# Patient Record
Sex: FEMALE | HISPANIC OR LATINO | Employment: FULL TIME | URBAN - METROPOLITAN AREA
[De-identification: names, ages, dates, MRNs, and addresses within clinical notes are randomized per-mention and may not be internally consistent; named-entity substitution may affect disease eponyms.]

---

## 2018-12-05 PROBLEM — R10.2 PELVIC AND PERINEAL PAIN: Status: RESOLVED | Noted: 2018-12-05 | Resolved: 2018-12-05

## 2018-12-05 PROBLEM — R10.2 PELVIC AND PERINEAL PAIN: Status: ACTIVE | Noted: 2018-12-05

## 2019-01-15 ENCOUNTER — HOSPITAL ENCOUNTER (EMERGENCY)
Facility: MEDICAL CENTER | Age: 39
End: 2019-01-15
Attending: EMERGENCY MEDICINE
Payer: COMMERCIAL

## 2019-01-15 VITALS
SYSTOLIC BLOOD PRESSURE: 119 MMHG | WEIGHT: 156.53 LBS | DIASTOLIC BLOOD PRESSURE: 80 MMHG | RESPIRATION RATE: 15 BRPM | HEART RATE: 77 BPM | TEMPERATURE: 97.5 F | HEIGHT: 61 IN | OXYGEN SATURATION: 96 % | BODY MASS INDEX: 29.55 KG/M2

## 2019-01-15 DIAGNOSIS — T31.0 BURNS INVOLVING LESS THAN 10% OF BODY SURFACE: ICD-10-CM

## 2019-01-15 LAB
BASOPHILS # BLD AUTO: 1 % (ref 0–1.8)
BASOPHILS # BLD: 0.08 K/UL (ref 0–0.12)
EOSINOPHIL # BLD AUTO: 0.54 K/UL (ref 0–0.51)
EOSINOPHIL NFR BLD: 6.7 % (ref 0–6.9)
ERYTHROCYTE [DISTWIDTH] IN BLOOD BY AUTOMATED COUNT: 40.7 FL (ref 35.9–50)
HCT VFR BLD AUTO: 43.1 % (ref 37–47)
HGB BLD-MCNC: 14.5 G/DL (ref 12–16)
IMM GRANULOCYTES # BLD AUTO: 0.09 K/UL (ref 0–0.11)
IMM GRANULOCYTES NFR BLD AUTO: 1.1 % (ref 0–0.9)
LYMPHOCYTES # BLD AUTO: 2.5 K/UL (ref 1–4.8)
LYMPHOCYTES NFR BLD: 31 % (ref 22–41)
MCH RBC QN AUTO: 30.5 PG (ref 27–33)
MCHC RBC AUTO-ENTMCNC: 33.6 G/DL (ref 33.6–35)
MCV RBC AUTO: 90.5 FL (ref 81.4–97.8)
MONOCYTES # BLD AUTO: 0.52 K/UL (ref 0–0.85)
MONOCYTES NFR BLD AUTO: 6.5 % (ref 0–13.4)
NEUTROPHILS # BLD AUTO: 4.33 K/UL (ref 2–7.15)
NEUTROPHILS NFR BLD: 53.7 % (ref 44–72)
NRBC # BLD AUTO: 0 K/UL
NRBC BLD-RTO: 0 /100 WBC
PLATELET # BLD AUTO: 309 K/UL (ref 164–446)
PMV BLD AUTO: 10.1 FL (ref 9–12.9)
RBC # BLD AUTO: 4.76 M/UL (ref 4.2–5.4)
WBC # BLD AUTO: 8.1 K/UL (ref 4.8–10.8)

## 2019-01-15 PROCEDURE — A9270 NON-COVERED ITEM OR SERVICE: HCPCS | Performed by: EMERGENCY MEDICINE

## 2019-01-15 PROCEDURE — 85025 COMPLETE CBC W/AUTO DIFF WBC: CPT

## 2019-01-15 PROCEDURE — 700102 HCHG RX REV CODE 250 W/ 637 OVERRIDE(OP)

## 2019-01-15 PROCEDURE — A9270 NON-COVERED ITEM OR SERVICE: HCPCS

## 2019-01-15 PROCEDURE — 36415 COLL VENOUS BLD VENIPUNCTURE: CPT

## 2019-01-15 PROCEDURE — 99284 EMERGENCY DEPT VISIT MOD MDM: CPT

## 2019-01-15 PROCEDURE — 700102 HCHG RX REV CODE 250 W/ 637 OVERRIDE(OP): Performed by: EMERGENCY MEDICINE

## 2019-01-15 RX ORDER — HYDROCODONE BITARTRATE AND ACETAMINOPHEN 5; 325 MG/1; MG/1
1 TABLET ORAL EVERY 6 HOURS PRN
Qty: 14 TAB | Refills: 0 | Status: SHIPPED | OUTPATIENT
Start: 2019-01-15 | End: 2019-01-18

## 2019-01-15 RX ORDER — HYDROCODONE BITARTRATE AND ACETAMINOPHEN 5; 325 MG/1; MG/1
1 TABLET ORAL ONCE
Status: COMPLETED | OUTPATIENT
Start: 2019-01-15 | End: 2019-01-15

## 2019-01-15 RX ORDER — SULFAMETHOXAZOLE AND TRIMETHOPRIM 800; 160 MG/1; MG/1
1 TABLET ORAL 2 TIMES DAILY
Qty: 14 TAB | Refills: 0 | Status: SHIPPED | OUTPATIENT
Start: 2019-01-15 | End: 2019-01-22

## 2019-01-15 RX ADMIN — HYDROCODONE BITARTRATE AND ACETAMINOPHEN 1 TABLET: 5; 325 TABLET ORAL at 20:35

## 2019-01-15 RX ADMIN — SILVER SULFADIAZINE 1 G: 10 CREAM TOPICAL at 21:45

## 2019-01-15 ASSESSMENT — PAIN SCALES - GENERAL: PAINLEVEL_OUTOF10: 9
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
## 2019-01-16 NOTE — ED NOTES
Pt discharged home. Assessment complete. Pt ambulates self. VS stable. Pt verbalized understanding discharge instructions. Prescriptions given pt verbalizes teaching provided. Narcotic consent signed and in chart.
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
## 2019-01-16 NOTE — ED NOTES
VS reassessed and stable, pt aware workman's comp is in progress, resting in NAD with family at BS.

## 2019-06-09 ENCOUNTER — APPOINTMENT (OUTPATIENT)
Dept: RADIOLOGY | Facility: MEDICAL CENTER | Age: 39
End: 2019-06-09
Attending: INTERNAL MEDICINE
Payer: COMMERCIAL

## 2019-06-09 ENCOUNTER — APPOINTMENT (OUTPATIENT)
Dept: RADIOLOGY | Facility: MEDICAL CENTER | Age: 39
End: 2019-06-09
Attending: EMERGENCY MEDICINE
Payer: COMMERCIAL

## 2019-06-09 ENCOUNTER — HOSPITAL ENCOUNTER (OUTPATIENT)
Facility: MEDICAL CENTER | Age: 39
End: 2019-06-11
Attending: EMERGENCY MEDICINE | Admitting: INTERNAL MEDICINE
Payer: COMMERCIAL

## 2019-06-09 DIAGNOSIS — G89.18 POST-OP PAIN: ICD-10-CM

## 2019-06-09 DIAGNOSIS — R07.0 THROAT PAIN: ICD-10-CM

## 2019-06-09 PROBLEM — R05.9 COUGH: Status: ACTIVE | Noted: 2019-06-09

## 2019-06-09 PROBLEM — D72.829 LEUKOCYTOSIS: Status: ACTIVE | Noted: 2019-06-09

## 2019-06-09 LAB
ANION GAP SERPL CALC-SCNC: 11 MMOL/L (ref 0–11.9)
BASOPHILS # BLD AUTO: 0.7 % (ref 0–1.8)
BASOPHILS # BLD: 0.1 K/UL (ref 0–0.12)
BLOOD CULTURE HOLD CXBCH: NORMAL
BUN SERPL-MCNC: 13 MG/DL (ref 8–22)
CALCIUM SERPL-MCNC: 9.5 MG/DL (ref 8.5–10.5)
CHLORIDE SERPL-SCNC: 103 MMOL/L (ref 96–112)
CO2 SERPL-SCNC: 25 MMOL/L (ref 20–33)
CREAT SERPL-MCNC: 0.61 MG/DL (ref 0.5–1.4)
EOSINOPHIL # BLD AUTO: 0.6 K/UL (ref 0–0.51)
EOSINOPHIL NFR BLD: 4.2 % (ref 0–6.9)
ERYTHROCYTE [DISTWIDTH] IN BLOOD BY AUTOMATED COUNT: 41.4 FL (ref 35.9–50)
GLUCOSE SERPL-MCNC: 81 MG/DL (ref 65–99)
HCG SERPL QL: NEGATIVE
HCT VFR BLD AUTO: 47.5 % (ref 37–47)
HGB BLD-MCNC: 15.5 G/DL (ref 12–16)
IMM GRANULOCYTES # BLD AUTO: 0.05 K/UL (ref 0–0.11)
IMM GRANULOCYTES NFR BLD AUTO: 0.4 % (ref 0–0.9)
LACTATE BLD-SCNC: 0.6 MMOL/L (ref 0.5–2)
LYMPHOCYTES # BLD AUTO: 2.31 K/UL (ref 1–4.8)
LYMPHOCYTES NFR BLD: 16.3 % (ref 22–41)
MCH RBC QN AUTO: 29.8 PG (ref 27–33)
MCHC RBC AUTO-ENTMCNC: 32.6 G/DL (ref 33.6–35)
MCV RBC AUTO: 91.2 FL (ref 81.4–97.8)
MONOCYTES # BLD AUTO: 0.9 K/UL (ref 0–0.85)
MONOCYTES NFR BLD AUTO: 6.3 % (ref 0–13.4)
NEUTROPHILS # BLD AUTO: 10.23 K/UL (ref 2–7.15)
NEUTROPHILS NFR BLD: 72.1 % (ref 44–72)
NRBC # BLD AUTO: 0 K/UL
NRBC BLD-RTO: 0 /100 WBC
PLATELET # BLD AUTO: 308 K/UL (ref 164–446)
PMV BLD AUTO: 9.9 FL (ref 9–12.9)
POTASSIUM SERPL-SCNC: 3.8 MMOL/L (ref 3.6–5.5)
RBC # BLD AUTO: 5.21 M/UL (ref 4.2–5.4)
SODIUM SERPL-SCNC: 139 MMOL/L (ref 135–145)
WBC # BLD AUTO: 14.2 K/UL (ref 4.8–10.8)

## 2019-06-09 PROCEDURE — 80048 BASIC METABOLIC PNL TOTAL CA: CPT

## 2019-06-09 PROCEDURE — 99285 EMERGENCY DEPT VISIT HI MDM: CPT

## 2019-06-09 PROCEDURE — 99220 PR INITIAL OBSERVATION CARE,LEVL III: CPT | Performed by: INTERNAL MEDICINE

## 2019-06-09 PROCEDURE — 85025 COMPLETE CBC W/AUTO DIFF WBC: CPT

## 2019-06-09 PROCEDURE — 84703 CHORIONIC GONADOTROPIN ASSAY: CPT

## 2019-06-09 PROCEDURE — 700117 HCHG RX CONTRAST REV CODE 255: Performed by: EMERGENCY MEDICINE

## 2019-06-09 PROCEDURE — G0378 HOSPITAL OBSERVATION PER HR: HCPCS

## 2019-06-09 PROCEDURE — 83605 ASSAY OF LACTIC ACID: CPT

## 2019-06-09 PROCEDURE — 700105 HCHG RX REV CODE 258: Performed by: EMERGENCY MEDICINE

## 2019-06-09 PROCEDURE — 71045 X-RAY EXAM CHEST 1 VIEW: CPT

## 2019-06-09 PROCEDURE — 700111 HCHG RX REV CODE 636 W/ 250 OVERRIDE (IP): Performed by: EMERGENCY MEDICINE

## 2019-06-09 PROCEDURE — 70491 CT SOFT TISSUE NECK W/DYE: CPT

## 2019-06-09 PROCEDURE — 96375 TX/PRO/DX INJ NEW DRUG ADDON: CPT

## 2019-06-09 PROCEDURE — 96374 THER/PROPH/DIAG INJ IV PUSH: CPT

## 2019-06-09 PROCEDURE — 84145 PROCALCITONIN (PCT): CPT

## 2019-06-09 RX ORDER — BISACODYL 10 MG
10 SUPPOSITORY, RECTAL RECTAL
Status: DISCONTINUED | OUTPATIENT
Start: 2019-06-09 | End: 2019-06-11 | Stop reason: HOSPADM

## 2019-06-09 RX ORDER — OXYCODONE HYDROCHLORIDE 5 MG/1
2.5 TABLET ORAL
Status: DISCONTINUED | OUTPATIENT
Start: 2019-06-09 | End: 2019-06-10

## 2019-06-09 RX ORDER — ONDANSETRON 2 MG/ML
4 INJECTION INTRAMUSCULAR; INTRAVENOUS ONCE
Status: COMPLETED | OUTPATIENT
Start: 2019-06-09 | End: 2019-06-09

## 2019-06-09 RX ORDER — KETOROLAC TROMETHAMINE 30 MG/ML
30 INJECTION, SOLUTION INTRAMUSCULAR; INTRAVENOUS EVERY 6 HOURS PRN
Status: DISCONTINUED | OUTPATIENT
Start: 2019-06-10 | End: 2019-06-11 | Stop reason: HOSPADM

## 2019-06-09 RX ORDER — MORPHINE SULFATE 4 MG/ML
2 INJECTION, SOLUTION INTRAMUSCULAR; INTRAVENOUS
Status: DISCONTINUED | OUTPATIENT
Start: 2019-06-09 | End: 2019-06-11 | Stop reason: HOSPADM

## 2019-06-09 RX ORDER — ONDANSETRON 4 MG/1
4 TABLET, ORALLY DISINTEGRATING ORAL EVERY 4 HOURS PRN
Status: DISCONTINUED | OUTPATIENT
Start: 2019-06-09 | End: 2019-06-11 | Stop reason: HOSPADM

## 2019-06-09 RX ORDER — OXYCODONE HYDROCHLORIDE 5 MG/1
5 TABLET ORAL
Status: DISCONTINUED | OUTPATIENT
Start: 2019-06-09 | End: 2019-06-10

## 2019-06-09 RX ORDER — HYDROCODONE BITARTRATE AND ACETAMINOPHEN 7.5; 325 MG/1; MG/1
1-2 TABLET ORAL EVERY 4 HOURS PRN
Status: ON HOLD | COMMUNITY
End: 2019-06-11

## 2019-06-09 RX ORDER — SODIUM CHLORIDE AND POTASSIUM CHLORIDE 150; 900 MG/100ML; MG/100ML
INJECTION, SOLUTION INTRAVENOUS CONTINUOUS
Status: DISCONTINUED | OUTPATIENT
Start: 2019-06-10 | End: 2019-06-10

## 2019-06-09 RX ORDER — METHYLPREDNISOLONE SODIUM SUCCINATE 125 MG/2ML
125 INJECTION, POWDER, LYOPHILIZED, FOR SOLUTION INTRAMUSCULAR; INTRAVENOUS ONCE
Status: COMPLETED | OUTPATIENT
Start: 2019-06-09 | End: 2019-06-09

## 2019-06-09 RX ORDER — POLYETHYLENE GLYCOL 3350 17 G/17G
1 POWDER, FOR SOLUTION ORAL
Status: DISCONTINUED | OUTPATIENT
Start: 2019-06-09 | End: 2019-06-11 | Stop reason: HOSPADM

## 2019-06-09 RX ORDER — AMOXICILLIN 250 MG
2 CAPSULE ORAL 2 TIMES DAILY
Status: DISCONTINUED | OUTPATIENT
Start: 2019-06-10 | End: 2019-06-11 | Stop reason: HOSPADM

## 2019-06-09 RX ORDER — SODIUM CHLORIDE 9 MG/ML
1000 INJECTION, SOLUTION INTRAVENOUS ONCE
Status: COMPLETED | OUTPATIENT
Start: 2019-06-09 | End: 2019-06-09

## 2019-06-09 RX ORDER — ONDANSETRON 2 MG/ML
4 INJECTION INTRAMUSCULAR; INTRAVENOUS EVERY 4 HOURS PRN
Status: DISCONTINUED | OUTPATIENT
Start: 2019-06-09 | End: 2019-06-11 | Stop reason: HOSPADM

## 2019-06-09 RX ORDER — ACETAMINOPHEN 325 MG/1
650 TABLET ORAL EVERY 6 HOURS PRN
Status: DISCONTINUED | OUTPATIENT
Start: 2019-06-09 | End: 2019-06-11 | Stop reason: HOSPADM

## 2019-06-09 RX ORDER — MORPHINE SULFATE 4 MG/ML
4 INJECTION, SOLUTION INTRAMUSCULAR; INTRAVENOUS ONCE
Status: COMPLETED | OUTPATIENT
Start: 2019-06-09 | End: 2019-06-09

## 2019-06-09 RX ORDER — KETOROLAC TROMETHAMINE 30 MG/ML
15 INJECTION, SOLUTION INTRAMUSCULAR; INTRAVENOUS ONCE
Status: COMPLETED | OUTPATIENT
Start: 2019-06-09 | End: 2019-06-09

## 2019-06-09 RX ORDER — MORPHINE SULFATE 4 MG/ML
4 INJECTION, SOLUTION INTRAMUSCULAR; INTRAVENOUS ONCE
Status: COMPLETED | OUTPATIENT
Start: 2019-06-09 | End: 2019-06-10

## 2019-06-09 RX ADMIN — MORPHINE SULFATE 4 MG: 4 INJECTION INTRAVENOUS at 20:58

## 2019-06-09 RX ADMIN — SODIUM CHLORIDE 1000 ML: 9 INJECTION, SOLUTION INTRAVENOUS at 20:59

## 2019-06-09 RX ADMIN — ONDANSETRON 4 MG: 2 INJECTION INTRAMUSCULAR; INTRAVENOUS at 20:58

## 2019-06-09 RX ADMIN — METHYLPREDNISOLONE SODIUM SUCCINATE 125 MG: 125 INJECTION, POWDER, FOR SOLUTION INTRAMUSCULAR; INTRAVENOUS at 20:58

## 2019-06-09 RX ADMIN — IOHEXOL 80 ML: 350 INJECTION, SOLUTION INTRAVENOUS at 21:43

## 2019-06-09 RX ADMIN — KETOROLAC TROMETHAMINE 15 MG: 30 INJECTION, SOLUTION INTRAMUSCULAR at 21:55

## 2019-06-09 ASSESSMENT — ENCOUNTER SYMPTOMS
NECK PAIN: 0
FEVER: 1
NAUSEA: 0
SHORTNESS OF BREATH: 1
FOCAL WEAKNESS: 0
DIAPHORESIS: 0
BLURRED VISION: 0
CHILLS: 1
DIARRHEA: 0
HEADACHES: 0
SEIZURES: 0
ABDOMINAL PAIN: 0
BACK PAIN: 0
SPUTUM PRODUCTION: 0
MYALGIAS: 0
VOMITING: 0
BRUISES/BLEEDS EASILY: 0
WHEEZING: 0
COUGH: 1
FLANK PAIN: 0
DIZZINESS: 0
PALPITATIONS: 0
BLOOD IN STOOL: 0

## 2019-06-10 PROBLEM — E86.0 DEHYDRATION: Status: ACTIVE | Noted: 2019-06-10

## 2019-06-10 PROBLEM — R00.0 TACHYCARDIA: Status: ACTIVE | Noted: 2019-06-10

## 2019-06-10 LAB
ANION GAP SERPL CALC-SCNC: 11 MMOL/L (ref 0–11.9)
BASOPHILS # BLD AUTO: 0.4 % (ref 0–1.8)
BASOPHILS # BLD: 0.06 K/UL (ref 0–0.12)
BUN SERPL-MCNC: 14 MG/DL (ref 8–22)
CALCIUM SERPL-MCNC: 8.3 MG/DL (ref 8.5–10.5)
CHLORIDE SERPL-SCNC: 107 MMOL/L (ref 96–112)
CO2 SERPL-SCNC: 21 MMOL/L (ref 20–33)
CREAT SERPL-MCNC: 0.52 MG/DL (ref 0.5–1.4)
EOSINOPHIL # BLD AUTO: 0.03 K/UL (ref 0–0.51)
EOSINOPHIL NFR BLD: 0.2 % (ref 0–6.9)
ERYTHROCYTE [DISTWIDTH] IN BLOOD BY AUTOMATED COUNT: 44.3 FL (ref 35.9–50)
GLUCOSE SERPL-MCNC: 109 MG/DL (ref 65–99)
HCT VFR BLD AUTO: 46.8 % (ref 37–47)
HGB BLD-MCNC: 14.5 G/DL (ref 12–16)
IMM GRANULOCYTES # BLD AUTO: 0.07 K/UL (ref 0–0.11)
IMM GRANULOCYTES NFR BLD AUTO: 0.5 % (ref 0–0.9)
LYMPHOCYTES # BLD AUTO: 1.11 K/UL (ref 1–4.8)
LYMPHOCYTES NFR BLD: 7.5 % (ref 22–41)
MCH RBC QN AUTO: 29.7 PG (ref 27–33)
MCHC RBC AUTO-ENTMCNC: 31 G/DL (ref 33.6–35)
MCV RBC AUTO: 95.7 FL (ref 81.4–97.8)
MONOCYTES # BLD AUTO: 0.08 K/UL (ref 0–0.85)
MONOCYTES NFR BLD AUTO: 0.5 % (ref 0–13.4)
NEUTROPHILS # BLD AUTO: 13.53 K/UL (ref 2–7.15)
NEUTROPHILS NFR BLD: 90.9 % (ref 44–72)
NRBC # BLD AUTO: 0 K/UL
NRBC BLD-RTO: 0 /100 WBC
PLATELET # BLD AUTO: 283 K/UL (ref 164–446)
PMV BLD AUTO: 10 FL (ref 9–12.9)
POTASSIUM SERPL-SCNC: 4.5 MMOL/L (ref 3.6–5.5)
PROCALCITONIN SERPL-MCNC: <0.05 NG/ML
RBC # BLD AUTO: 4.89 M/UL (ref 4.2–5.4)
SODIUM SERPL-SCNC: 139 MMOL/L (ref 135–145)
WBC # BLD AUTO: 14.9 K/UL (ref 4.8–10.8)

## 2019-06-10 PROCEDURE — 700111 HCHG RX REV CODE 636 W/ 250 OVERRIDE (IP): Performed by: EMERGENCY MEDICINE

## 2019-06-10 PROCEDURE — G0378 HOSPITAL OBSERVATION PER HR: HCPCS

## 2019-06-10 PROCEDURE — 96376 TX/PRO/DX INJ SAME DRUG ADON: CPT

## 2019-06-10 PROCEDURE — 700102 HCHG RX REV CODE 250 W/ 637 OVERRIDE(OP): Performed by: NURSE PRACTITIONER

## 2019-06-10 PROCEDURE — 700101 HCHG RX REV CODE 250: Performed by: INTERNAL MEDICINE

## 2019-06-10 PROCEDURE — 700111 HCHG RX REV CODE 636 W/ 250 OVERRIDE (IP): Performed by: INTERNAL MEDICINE

## 2019-06-10 PROCEDURE — 700105 HCHG RX REV CODE 258: Performed by: NURSE PRACTITIONER

## 2019-06-10 PROCEDURE — 99226 PR SUBSEQUENT OBSERVATION CARE,LEVEL III: CPT | Performed by: INTERNAL MEDICINE

## 2019-06-10 PROCEDURE — 85025 COMPLETE CBC W/AUTO DIFF WBC: CPT

## 2019-06-10 PROCEDURE — 80048 BASIC METABOLIC PNL TOTAL CA: CPT

## 2019-06-10 RX ORDER — SODIUM CHLORIDE 9 MG/ML
INJECTION, SOLUTION INTRAVENOUS CONTINUOUS
Status: DISCONTINUED | OUTPATIENT
Start: 2019-06-10 | End: 2019-06-11 | Stop reason: HOSPADM

## 2019-06-10 RX ORDER — OXYCODONE HCL 20 MG/ML
5-10 CONCENTRATE, ORAL ORAL EVERY 4 HOURS PRN
Status: DISCONTINUED | OUTPATIENT
Start: 2019-06-10 | End: 2019-06-11 | Stop reason: HOSPADM

## 2019-06-10 RX ADMIN — SODIUM CHLORIDE: 9 INJECTION, SOLUTION INTRAVENOUS at 11:28

## 2019-06-10 RX ADMIN — POTASSIUM CHLORIDE AND SODIUM CHLORIDE: 900; 150 INJECTION, SOLUTION INTRAVENOUS at 01:41

## 2019-06-10 RX ADMIN — MORPHINE SULFATE 4 MG: 4 INJECTION INTRAVENOUS at 01:41

## 2019-06-10 RX ADMIN — MORPHINE SULFATE 2 MG: 4 INJECTION INTRAVENOUS at 20:58

## 2019-06-10 RX ADMIN — ONDANSETRON 4 MG: 2 INJECTION INTRAMUSCULAR; INTRAVENOUS at 10:04

## 2019-06-10 RX ADMIN — LIDOCAINE HYDROCHLORIDE 15 ML: 20 SOLUTION ORAL; TOPICAL at 11:30

## 2019-06-10 RX ADMIN — POTASSIUM CHLORIDE AND SODIUM CHLORIDE: 900; 150 INJECTION, SOLUTION INTRAVENOUS at 10:03

## 2019-06-10 RX ADMIN — LIDOCAINE HYDROCHLORIDE 15 ML: 20 SOLUTION ORAL; TOPICAL at 20:45

## 2019-06-10 RX ADMIN — PHENOL 1 SPRAY: 1.5 LIQUID ORAL at 08:49

## 2019-06-10 RX ADMIN — KETOROLAC TROMETHAMINE 30 MG: 30 INJECTION, SOLUTION INTRAMUSCULAR at 16:38

## 2019-06-10 RX ADMIN — MORPHINE SULFATE 2 MG: 4 INJECTION INTRAVENOUS at 13:47

## 2019-06-10 RX ADMIN — KETOROLAC TROMETHAMINE 30 MG: 30 INJECTION, SOLUTION INTRAMUSCULAR at 05:57

## 2019-06-10 RX ADMIN — MORPHINE SULFATE 2 MG: 4 INJECTION INTRAVENOUS at 10:04

## 2019-06-10 RX ADMIN — SODIUM CHLORIDE: 9 INJECTION, SOLUTION INTRAVENOUS at 20:51

## 2019-06-10 ASSESSMENT — ENCOUNTER SYMPTOMS
VOMITING: 0
COUGH: 1
SHORTNESS OF BREATH: 0
NAUSEA: 0
SORE THROAT: 1

## 2019-06-10 ASSESSMENT — LIFESTYLE VARIABLES
EVER_SMOKED: NEVER
ALCOHOL_USE: NO

## 2019-06-10 ASSESSMENT — PATIENT HEALTH QUESTIONNAIRE - PHQ9
2. FEELING DOWN, DEPRESSED, IRRITABLE, OR HOPELESS: NOT AT ALL
2. FEELING DOWN, DEPRESSED, IRRITABLE, OR HOPELESS: NOT AT ALL
1. LITTLE INTEREST OR PLEASURE IN DOING THINGS: NOT AT ALL
1. LITTLE INTEREST OR PLEASURE IN DOING THINGS: NOT AT ALL
2. FEELING DOWN, DEPRESSED, IRRITABLE, OR HOPELESS: NOT AT ALL
SUM OF ALL RESPONSES TO PHQ9 QUESTIONS 1 AND 2: 0
1. LITTLE INTEREST OR PLEASURE IN DOING THINGS: NOT AT ALL

## 2019-06-10 ASSESSMENT — COPD QUESTIONNAIRES
DO YOU EVER COUGH UP ANY MUCUS OR PHLEGM?: YES, A FEW DAYS A WEEK OR MONTH
HAVE YOU SMOKED AT LEAST 100 CIGARETTES IN YOUR ENTIRE LIFE: NO/DON'T KNOW
IN THE PAST 12 MONTHS DO YOU DO LESS THAN YOU USED TO BECAUSE OF YOUR BREATHING PROBLEMS: DISAGREE/UNSURE
DURING THE PAST 4 WEEKS HOW MUCH DID YOU FEEL SHORT OF BREATH: NONE/LITTLE OF THE TIME
DURING THE PAST 4 WEEKS HOW MUCH DID YOU FEEL SHORT OF BREATH: NONE/LITTLE OF THE TIME
COPD SCREENING SCORE: 1
DO YOU EVER COUGH UP ANY MUCUS OR PHLEGM?: YES, A FEW DAYS A WEEK OR MONTH
HAVE YOU SMOKED AT LEAST 100 CIGARETTES IN YOUR ENTIRE LIFE: NO/DON'T KNOW
COPD SCREENING SCORE: 1

## 2019-06-10 ASSESSMENT — PAIN SCALES - WONG BAKER: WONGBAKER_NUMERICALRESPONSE: DOESN'T HURT AT ALL

## 2019-06-10 NOTE — ASSESSMENT & PLAN NOTE
-she reports minimal improvement with interventions thus far - IV Morphine, Oral lidocaine, frozen popsicles   -I've ordered oxycodone elixir but patient is very resistant to taking anything PO  -CT showed enlarged uvula with no evidence of abscess  -continue IVF hydration

## 2019-06-10 NOTE — PROGRESS NOTES
[facility] Medicine Daily Progress Note    Date of Service  6/10/2019    Chief Complaint  Throat Pain    Hospital Course   This is a 38-year-old female who recently underwent tonsillectomy who presented 6/9/2019 with throat pain and inability to take PO.  CT scan showed enlargement of the uvula with no signs of abscess.       Interval Problem Update  -resistant to take anything PO due to pain. Continue IVF hydration and prn analgesics  -she has no s/s upper airway compromise, no respiratory distress    Consultants/Specialty  NA    Code Status  FULL    Disposition  Plan to DC home tomorrow.     Review of Systems  Review of Systems   Reason unable to perform ROS: Limited - patient not wanting to speak due to throat pain.   HENT: Positive for sore throat.    Respiratory: Positive for cough. Negative for shortness of breath.    Gastrointestinal: Negative for nausea and vomiting.        Physical Exam  Temp:  [36.5 °C (97.7 °F)-37.3 °C (99.2 °F)] 36.7 °C (98.1 °F)  Pulse:  [] 100  Resp:  [12-18] 18  BP: ()/(58-87) 107/64  SpO2:  [86 %-99 %] 91 %    Physical Exam   Constitutional: She is oriented to person, place, and time. Vital signs are normal. She appears well-developed and well-nourished.  Non-toxic appearance. No distress.   HENT:   Head: Normocephalic.   Right Ear: Hearing normal.   Left Ear: Hearing normal.   Nose: Nose normal.   Mouth/Throat: Oropharynx is clear and moist and mucous membranes are normal. Uvula swelling present.   Eyes: Conjunctivae and EOM are normal. No scleral icterus.   Neck: No JVD present.   Cardiovascular: Normal rate, regular rhythm and normal heart sounds.    Pulmonary/Chest: Effort normal and breath sounds normal. She has no wheezes. She has no rhonchi.   Abdominal: Soft. Normal appearance and bowel sounds are normal. She exhibits no distension. There is no tenderness. There is no rigidity.   Musculoskeletal: Normal range of motion.   Neurological: She is alert and oriented to  person, place, and time. She has normal strength. GCS eye subscore is 4. GCS verbal subscore is 5. GCS motor subscore is 6.   Skin: Skin is warm and dry.   Psychiatric: Her behavior is normal. Her affect is blunt. Cognition and memory are normal.   Nursing note and vitals reviewed.      Fluids    Intake/Output Summary (Last 24 hours) at 06/10/19 1222  Last data filed at 06/10/19 0700   Gross per 24 hour   Intake          1664.58 ml   Output                0 ml   Net          1664.58 ml       Laboratory  Recent Labs      06/09/19   2025  06/10/19   0131   WBC  14.2*  14.9*   RBC  5.21  4.89   HEMOGLOBIN  15.5  14.5   HEMATOCRIT  47.5*  46.8   MCV  91.2  95.7   MCH  29.8  29.7   MCHC  32.6*  31.0*   RDW  41.4  44.3   PLATELETCT  308  283   MPV  9.9  10.0     Recent Labs      06/09/19   2025  06/10/19   0131   SODIUM  139  139   POTASSIUM  3.8  4.5   CHLORIDE  103  107   CO2  25  21   GLUCOSE  81  109*   BUN  13  14   CREATININE  0.61  0.52   CALCIUM  9.5  8.3*                   Imaging  DX-CHEST-PORTABLE (1 VIEW)   Final Result         1.  No focal infiltrates.   2.  Perihilar interstitial prominence and bronchial wall cuffing, appearance suggests changes of underlying bronchial inflammation, consider bronchitis.      CT-SOFT TISSUE NECK WITH   Final Result         1.  Surgical changes of tonsillectomy, no enhancing fluid collection identified.   2.  Enlargement of the uvula           Assessment/Plan  Post-tonsillectomy throat pain- (present on admission)   Assessment & Plan    -she reports minimal improvement with interventions thus far - IV Morphine, Oral lidocaine, frozen popsicles   -I've ordered oxycodone elixir but patient is very resistant to taking anything PO  -CT showed enlarged uvula with no evidence of abscess  -continue IVF hydration     Tachycardia- (present on admission)   Assessment & Plan    -due to pain and dehydration  -resolved after IVF      Dehydration- (present on admission)   Assessment & Plan     -due to poor PO intake  -continue IVF     Cough- (present on admission)   Assessment & Plan    -x-ray was unremarkable  -better today     Leukocytosis- (present on admission)   Assessment & Plan    -Likely secondary to periprocedural steroids  -no indications of infection           VTE prophylaxis: SCD    MED Meneses.

## 2019-06-10 NOTE — PROGRESS NOTES
Pt arrived to unit via gurney at 0030. Pt oriented to room, unit, and plan of care. Admit profile and assessment completed. VSS. AOx4, no neuro deficits noted. 9/10 throat pain at this time, medicated per MAR. Unlabored and even breathing, 2LNC. Slight nonproductive cough, pain increased during.  Swallow eval unable to evaluate. Pt states unable to swallow and failed swallow eval in ER, strict NPO. Will continue to evaluate. Labs drawn and sent to lab. IV infusing per MAR.  at bedside. All questions answered at this time. Call light within reach; fall precautions in place.

## 2019-06-10 NOTE — DISCHARGE PLANNING
Care Transition Team Assessment    Spoke with patient and spouse at bedside with verbal consent. Anticipate no needs @ present time. Spouse will be ride @ D/C.    Information Source  Orientation : Oriented x 4  Information Given By: Patient    Readmission Evaluation  Is this a readmission?: No    Interdisciplinary Discharge Planning  Does Admitting Nurse Feel This Could be a Complex Discharge?: No  Primary Care Physician: None  Lives with - Patient's Self Care Capacity: Spouse, Child Less than 18 Years of Age  Patient or legal guardian wants to designate a caregiver (see row info): No  Support Systems: Spouse / Significant Other  Housing / Facility: 1 Story Apartment / Condo  Do You Take your Prescribed Medications Regularly: Yes  Able to Return to Previous ADL's: Yes  Mobility Issues: No  Prior Services: Home-Independent  Patient Expects to be Discharged to:: Home  Assistance Needed: No  Durable Medical Equipment: Not Applicable    Discharge Preparedness  What are your discharge supports?: Spouse  Prior Functional Level: Ambulatory    Functional Assesment  Prior Functional Level: Ambulatory    Finances  Prescription Coverage: Yes  Anticipated Discharge Information  Anticipated discharge disposition: Home  Discharge Address: [address] [place] [address] [address] [place] [address]  Discharge Contact Phone Number: [phone]

## 2019-06-10 NOTE — ED PROVIDER NOTES
ED Provider Note    Scribed for Keven Donis M.D. by Zay Kilpatrick. 6/9/2019, 8:20 PM.    Primary care provider: Pcp Pt States None  Means of arrival: Walk in  History obtained from: Patient and Patient's   History limited by: None    CHIEF COMPLAINT  Chief Complaint   Patient presents with   • Post Op Bleeding     post tonsillectomy, bleeding, pain       HPI  Claire Kwong is a 38 y.o. female status post tonsillectomy 2 days ago who presents to the Emergency Department for evaluation of post op throat pain onset 3 hours following her tonsillectomy. She was prompted to come to the ED as the pain has progressively worsened in severity today. The patient notes she has had associated bleeding from the surgical site. She additionally endorses a subjective fever, chills, and headache. The patient reports she also has been unable to eat or drink following the surgery secondary to the pain, and notes she has had some hematuria, but no dysuria. She states she was prescribed Codeine following the surgery, but it has provided no alleviation of her pain. The patient notes the surgery was performed by Dr. James Domínguez.     REVIEW OF SYSTEMS  Pertinent positives include throat pain, bleeding from throat, subjective fever, chills, headache, and hematuria. Pertinent negatives include no dysuria. As above, all other systems reviewed and are negative.   See HPI for further details.     PAST MEDICAL HISTORY   has a past medical history of Hx of ectopic pregnancy x 2 (11/10/2011) and Migraine.    SURGICAL HISTORY   has a past surgical history that includes cholecystectomy; tube & ectopic preg., removal (2001 & 1/2011); abdominal exploration; latrell by laparoscopy (4/30/2012); gyn surgery; and laparoscopic assisted subtotal hysterectomy (12/4/2018).    SOCIAL HISTORY  Social History   Substance Use Topics   • Smoking status: Passive Smoke Exposure - Never Smoker   • Smokeless tobacco: Never Used   • Alcohol use No      History   Drug  "Use No       FAMILY HISTORY  Family History   Problem Relation Age of Onset   • Hypertension Mother    • Diabetes Maternal Grandmother        CURRENT MEDICATIONS  Home Medications     Reviewed by Fernandez Gallo (Pharmacy Tech) on 06/09/19 at 2259  Med List Status: Complete   Medication Last Dose Status   HYDROcodone-acetaminophen (NORCO) 7.5-325 MG per tablet 6/9/2019 Active                ALLERGIES  Allergies   Allergen Reactions   • Shellfish Allergy Anaphylaxis, Hives and Shortness of Breath   • Shellfish Allergy        PHYSICAL EXAM  VITAL SIGNS: /87   Pulse (!) 105   Temp 37.3 °C (99.2 °F) (Temporal)   Resp 14   Ht 1.549 m (5' 1\")   Wt 71.9 kg (158 lb 8.2 oz)   SpO2 95%   BMI 29.95 kg/m²   Vitals reviewed.  Constitutional: Alert but distressed secondary to pain. Tearful.  HENT: No signs of trauma, Bilateral external ears normal, Nose normal. Dry mucous membranes. Trismus, unable to visualize posterior oropharynx secondary to patient discomfort. No pooling of secretions.   Eyes: Pupils are equal and reactive, Conjunctiva normal, Non-icteric.   Neck: Normal range of motion, No tenderness, Supple, No stridor.   Lymphatic: No lymphadenopathy noted.   Cardiovascular: Tachycardic rate and regular rhythm, no murmurs.   Thorax & Lungs: Normal breath sounds, No respiratory distress, No wheezing, No chest tenderness.   Abdomen: Bowel sounds normal, Soft, No tenderness, No peritoneal signs, No masses, No pulsatile masses.   Skin: Warm, Dry, No erythema, No rash.   Back: Normal alignment.   Extremities: Intact distal pulses, No edema, No tenderness, No cyanosis  Musculoskeletal: Good range of motion in all major joints. No major deformities noted.   Neurologic: Alert, Normal motor function, Normal sensory function, No focal deficits noted.   Psychiatric: Affect normal, Judgment normal, Mood normal.     DIAGNOSTIC STUDIES / PROCEDURES    LABS  Labs Reviewed   CBC WITH DIFFERENTIAL - Abnormal; Notable for the " following:        Result Value    WBC 14.2 (*)     Hematocrit 47.5 (*)     MCHC 32.6 (*)     Neutrophils-Polys 72.10 (*)     Lymphocytes 16.30 (*)     Neutrophils (Absolute) 10.23 (*)     Monos (Absolute) 0.90 (*)     Eos (Absolute) 0.60 (*)     All other components within normal limits   BASIC METABOLIC PANEL   HCG QUAL SERUM   BLOOD CULTURE,HOLD   LACTIC ACID   ESTIMATED GFR   URINALYSIS      All labs reviewed by me.    COURSE & MEDICAL DECISION MAKING  Nursing notes, VS, PMSFHx reviewed in chart.  Differential diagnoses include but not limited to: expected postoperative pain, post operative complication, retro pharyngeal abscess, viral syndrome.    8:20 PM Patient seen and examined at bedside. Patient arrives tachycardic with otherwise normal vital signs. Patient appears dehydrated but is non-toxic appearing. The physical exam is remarkable for trismus due to pain and I am unable to visualize posterior oropharynx at this time. Patient will be treated with Morphine 4 mg, Solu-medrol 125 mg, and Zofran 4 mg to help manage her pain and repeat exam will be performed for better visualization. She will additionally be given IV fluids for dehydration. Ordered for CBC with diff, BMP, UA, and Beta-HCG to evaluate.     CBC reveals leukocytosis with neutrophilic predominance. No bandemia. Metabolic panel reveals normal electrolytes and renal function. Glucose is slightly elevated at 109 and calcium is low at 8.3.     9:02 PM - Patient was reevaluated at bedside. Patient appears mildly improved, but is still unable to speak secondary to the pain. She also cries when I evaluate her posterior oropharynx. She will be given a dose of Toradol 15 mg and reevaluated shortly. Ordered CT-Soft tissue of neck w/ for further evaluation given continued distress.    Posterior OP was ultimately visualized and revealed expected post-surgical changes. No bleeding. CT reveals enlarged uvula without enhancing fluid collection.    10:14 PM -  Paged Dr. Domínguez, ENT.     10:16 PM - Patient was reevaluated at bedside. She is resting in bed with stable vital signs. The patient reports to feel mildly improved, but refuses to take PO or speak due to the pain. Discussed lab and CT results with the patient that are reassuring. She was informed I will be discussing her case with Dr. Domínguez, ENT, to further determine a plan of care but she will likely be admitted as her pain is unable to be controlled.     10:59 PM - I discussed the patient's case and the above findings with Dr. Mcgrath (ENT) who states the amount of pain is experiencing is to be expected 5-7 days following surgery. He is not concerned about her elevated WBC as she was given steroids during the surgery. He feels the patient can be discharged home from a surgical standpoint but feels that an admission for pain control is acceptable.     11:00 PM - Reevaluated patient who continues to decline PO. She remains tachycardic and tearful with exam. She does not want to be discharged.    11:03 PM - Paged Hospitalist.    11:12 PM -  I discussed the patient's case and the above findings with Dr. Saul (Hospitalist) who agrees to admit the patient.    HYDRATION: Based on the patient's presentation of Dehydration the patient was given IV fluids. IV Hydration was used because oral hydration was not adequate alone. Upon recheck following hydration, the patient was improved.    DISPOSITION:  Patient will be admitted to Dr. Saul (Hospitalist) in guarded condition.    FINAL IMPRESSION  1. Post-op pain          Zay LOZANO (Mathieu), am scribing for, and in the presence of, Keven Donis M.D..    Electronically signed by: Zay Kilpatrick (Mathieu), 6/9/2019    Keven LOZANO M.D. personally performed the services described in this documentation, as scribed by Zay Kilpatrick in my presence, and it is both accurate and complete.    C.    The note accurately reflects work and decisions made by me.  Keven Donis   6/10/2019  3:58 PM

## 2019-06-10 NOTE — CARE PLAN
Problem: Pain Management  Goal: Pain level will decrease to patient's comfort goal  Outcome: PROGRESSING AS EXPECTED  Pain managed with morphine and toradol. Will monitor    Problem: Knowledge Deficit  Goal: Knowledge of disease process/condition, treatment plan, diagnostic tests, and medications will improve  Outcome: PROGRESSING AS EXPECTED  Pt educated on reason for throat pain, verbalized understanding

## 2019-06-10 NOTE — H&P
Hospital Medicine History & Physical Note    Date of Service  6/9/2019    Primary Care Physician  Pcp Pt States None    Consultants  ENT    Code Status  Full code    Chief Complaint  Throat pain    History of Presenting Illness  38 y.o. female who presented 6/9/2019 with severe postop throat pain following her tonsillectomy today by Dr. James Domínguez.  Pain is worse with opening her mouth, speaking or swallowing.  She has been unable to tolerate any p.o. intake including fluids.  She reports subjective fevers and chills. She also reports a dry cough with mild shortness of breath.  She reports a mild headache.  She denies any chest pain, nausea, vomiting or abdominal pain.  She was prescribed codeine for postop pain which has provided no pain relief.    Review of Systems  Review of Systems   Constitutional: Positive for chills and fever. Negative for diaphoresis.   HENT: Negative for hearing loss.         Throat pain  odynophagia   Eyes: Negative for blurred vision.   Respiratory: Positive for cough and shortness of breath. Negative for sputum production and wheezing.    Cardiovascular: Negative for chest pain, palpitations and leg swelling.   Gastrointestinal: Negative for abdominal pain, blood in stool, diarrhea, nausea and vomiting.   Genitourinary: Negative for dysuria, flank pain and urgency.   Musculoskeletal: Negative for back pain, joint pain, myalgias and neck pain.   Skin: Negative for rash.   Neurological: Negative for dizziness, focal weakness, seizures and headaches.   Endo/Heme/Allergies: Does not bruise/bleed easily.   Psychiatric/Behavioral: Negative for suicidal ideas.   All other systems reviewed and are negative.      Past Medical History   has a past medical history of Hx of ectopic pregnancy x 2 (11/10/2011) and Migraine.    Surgical History   has a past surgical history that includes cholecystectomy; tube & ectopic preg., removal (2001 & 1/2011); abdominal exploration; latrell by laparoscopy  (4/30/2012); gyn surgery; and laparoscopic assisted subtotal hysterectomy (12/4/2018).     Family History  family history includes Diabetes in her maternal grandmother; Hypertension in her mother.     Social History   reports that she is a non-smoker but has been exposed to tobacco smoke. She has never used smokeless tobacco. She reports that she does not drink alcohol or use drugs.    Allergies  Allergies   Allergen Reactions   • Shellfish Allergy Anaphylaxis, Hives and Shortness of Breath   • Shellfish Allergy        Medications  Prior to Admission Medications   Prescriptions Last Dose Informant Patient Reported? Taking?   HYDROcodone-acetaminophen (NORCO) 7.5-325 MG per tablet 6/9/2019 at 1400  Yes Yes   Sig: Take 1-2 Tabs by mouth every four hours as needed for Severe Pain.      Facility-Administered Medications: None       Physical Exam  Temp:  [37.3 °C (99.2 °F)] 37.3 °C (99.2 °F)  Pulse:  [] 100  Resp:  [14] 14  BP: (120)/(87) 120/87  SpO2:  [95 %-99 %] 98 %    Physical Exam   Constitutional: She is oriented to person, place, and time. She appears well-developed and well-nourished. No distress.   HENT:   Head: Normocephalic and atraumatic.   Mouth/Throat: Oropharynx is clear and moist.   Eyes: Pupils are equal, round, and reactive to light. Conjunctivae are normal. Right eye exhibits no discharge. Left eye exhibits no discharge. No scleral icterus.   Neck: Normal range of motion. Neck supple. No tracheal deviation present.   Cardiovascular: Normal rate, regular rhythm and normal heart sounds.    Pulmonary/Chest: Effort normal and breath sounds normal. No stridor. No respiratory distress. She has no wheezes. She has no rales.   Abdominal: Soft. Bowel sounds are normal. She exhibits no distension. There is no tenderness. There is no rebound and no guarding.   Musculoskeletal: Normal range of motion. She exhibits no edema, tenderness or deformity.   Lymphadenopathy:     She has no cervical adenopathy.    Neurological: She is alert and oriented to person, place, and time. No cranial nerve deficit. Coordination normal.   Skin: Skin is warm and dry. No rash noted. She is not diaphoretic. No erythema.   Psychiatric: She has a normal mood and affect. Her behavior is normal.   Nursing note and vitals reviewed.      Laboratory:  Recent Labs      06/09/19 2025   WBC  14.2*   RBC  5.21   HEMOGLOBIN  15.5   HEMATOCRIT  47.5*   MCV  91.2   MCH  29.8   MCHC  32.6*   RDW  41.4   PLATELETCT  308   MPV  9.9     Recent Labs      06/09/19 2025   SODIUM  139   POTASSIUM  3.8   CHLORIDE  103   CO2  25   GLUCOSE  81   BUN  13   CREATININE  0.61   CALCIUM  9.5     Recent Labs      06/09/19 2025   GLUCOSE  81                 No results for input(s): TROPONINI in the last 72 hours.    Urinalysis:    No results found     Imaging:  CT-SOFT TISSUE NECK WITH   Final Result         1.  Surgical changes of tonsillectomy, no enhancing fluid collection identified.   2.  Enlargement of the uvula      DX-CHEST-PORTABLE (1 VIEW)    (Results Pending)         Assessment/Plan:  I anticipate this patient is appropriate for observation status at this time.    Throat pain- (present on admission)   Assessment & Plan    Intractable throat pain status post tonsillectomy, patient is unable to tolerate p.o. intake and is tachycardic and dehydrated  IV fluid hydration with NS plus KCl  CTA neck soft tissue with IV contrast reveals an enlarged uvula and no evidence of abscess  Pain control with Tylenol, oxycodone and IV Toradol.  IV morphine for breakthrough pain, monitor respiratory status closely  Case discussed with ENT         Tachycardia- (present on admission)   Assessment & Plan    Sinus tachycardia secondary to dehydration and pain  IVF hydration and pain control as above      Dehydration- (present on admission)   Assessment & Plan    IVF hydration with NS     Cough- (present on admission)   Assessment & Plan    Likely secondary to postnasal  drip  Check chest x-ray to rule out pneumonia     Leukocytosis- (present on admission)   Assessment & Plan    Likely secondary to periprocedural steroids  Follow CBC and vitals  Check procalcitonin         VTE prophylaxis: scd

## 2019-06-10 NOTE — PROGRESS NOTES
"Pt refused swallow eval during admittion and was asked again in morning but refused twice. States unable to swallow, even secretions and that fluid will \"go back up nose\". Clear liquid diet discontinued and strict NPO started. Speech evaluation ordered. Suction placed at bedside, although no problems during the night with swallowing secretions. Pt educated, verbalized understanding  "

## 2019-06-10 NOTE — PROGRESS NOTES
Rounding: Pt sleeping, calm, unlabored and even breathing. In no signs of pain or distress. Will continue to monitor .

## 2019-06-10 NOTE — ED TRIAGE NOTES
Chief Complaint   Patient presents with   • Post Op Bleeding     post tonsillectomy, bleeding, pain     Pt controls own secretions.  Obvious discomfort

## 2019-06-11 ENCOUNTER — PATIENT OUTREACH (OUTPATIENT)
Dept: HEALTH INFORMATION MANAGEMENT | Facility: OTHER | Age: 39
End: 2019-06-11

## 2019-06-11 VITALS
HEART RATE: 97 BPM | BODY MASS INDEX: 30.09 KG/M2 | SYSTOLIC BLOOD PRESSURE: 112 MMHG | OXYGEN SATURATION: 91 % | TEMPERATURE: 99.3 F | WEIGHT: 159.39 LBS | DIASTOLIC BLOOD PRESSURE: 73 MMHG | RESPIRATION RATE: 16 BRPM | HEIGHT: 61 IN

## 2019-06-11 PROBLEM — D72.829 LEUKOCYTOSIS: Status: RESOLVED | Noted: 2019-06-09 | Resolved: 2019-06-11

## 2019-06-11 PROBLEM — R13.11 ORAL PHASE DYSPHAGIA: Status: ACTIVE | Noted: 2019-06-11

## 2019-06-11 PROBLEM — E86.0 DEHYDRATION: Status: RESOLVED | Noted: 2019-06-10 | Resolved: 2019-06-11

## 2019-06-11 PROBLEM — R00.0 TACHYCARDIA: Status: RESOLVED | Noted: 2019-06-10 | Resolved: 2019-06-11

## 2019-06-11 LAB
ANION GAP SERPL CALC-SCNC: 7 MMOL/L (ref 0–11.9)
BASOPHILS # BLD AUTO: 0.5 % (ref 0–1.8)
BASOPHILS # BLD: 0.05 K/UL (ref 0–0.12)
BUN SERPL-MCNC: 19 MG/DL (ref 8–22)
CALCIUM SERPL-MCNC: 8.2 MG/DL (ref 8.5–10.5)
CHLORIDE SERPL-SCNC: 111 MMOL/L (ref 96–112)
CO2 SERPL-SCNC: 24 MMOL/L (ref 20–33)
CREAT SERPL-MCNC: 0.46 MG/DL (ref 0.5–1.4)
EOSINOPHIL # BLD AUTO: 0.56 K/UL (ref 0–0.51)
EOSINOPHIL NFR BLD: 5.2 % (ref 0–6.9)
ERYTHROCYTE [DISTWIDTH] IN BLOOD BY AUTOMATED COUNT: 44.4 FL (ref 35.9–50)
GLUCOSE SERPL-MCNC: 86 MG/DL (ref 65–99)
HCT VFR BLD AUTO: 40.2 % (ref 37–47)
HGB BLD-MCNC: 13 G/DL (ref 12–16)
IMM GRANULOCYTES # BLD AUTO: 0.02 K/UL (ref 0–0.11)
IMM GRANULOCYTES NFR BLD AUTO: 0.2 % (ref 0–0.9)
LYMPHOCYTES # BLD AUTO: 3.17 K/UL (ref 1–4.8)
LYMPHOCYTES NFR BLD: 29.7 % (ref 22–41)
MCH RBC QN AUTO: 30.4 PG (ref 27–33)
MCHC RBC AUTO-ENTMCNC: 32.3 G/DL (ref 33.6–35)
MCV RBC AUTO: 93.9 FL (ref 81.4–97.8)
MONOCYTES # BLD AUTO: 0.61 K/UL (ref 0–0.85)
MONOCYTES NFR BLD AUTO: 5.7 % (ref 0–13.4)
NEUTROPHILS # BLD AUTO: 6.26 K/UL (ref 2–7.15)
NEUTROPHILS NFR BLD: 58.7 % (ref 44–72)
NRBC # BLD AUTO: 0 K/UL
NRBC BLD-RTO: 0 /100 WBC
PLATELET # BLD AUTO: 271 K/UL (ref 164–446)
PMV BLD AUTO: 10.4 FL (ref 9–12.9)
POTASSIUM SERPL-SCNC: 4 MMOL/L (ref 3.6–5.5)
RBC # BLD AUTO: 4.28 M/UL (ref 4.2–5.4)
SODIUM SERPL-SCNC: 142 MMOL/L (ref 135–145)
WBC # BLD AUTO: 10.7 K/UL (ref 4.8–10.8)

## 2019-06-11 PROCEDURE — 700101 HCHG RX REV CODE 250: Performed by: INTERNAL MEDICINE

## 2019-06-11 PROCEDURE — 700102 HCHG RX REV CODE 250 W/ 637 OVERRIDE(OP): Performed by: NURSE PRACTITIONER

## 2019-06-11 PROCEDURE — 700111 HCHG RX REV CODE 636 W/ 250 OVERRIDE (IP): Performed by: INTERNAL MEDICINE

## 2019-06-11 PROCEDURE — 80048 BASIC METABOLIC PNL TOTAL CA: CPT

## 2019-06-11 PROCEDURE — 36415 COLL VENOUS BLD VENIPUNCTURE: CPT

## 2019-06-11 PROCEDURE — G0378 HOSPITAL OBSERVATION PER HR: HCPCS

## 2019-06-11 PROCEDURE — 96376 TX/PRO/DX INJ SAME DRUG ADON: CPT

## 2019-06-11 PROCEDURE — A9270 NON-COVERED ITEM OR SERVICE: HCPCS | Performed by: INTERNAL MEDICINE

## 2019-06-11 PROCEDURE — 85025 COMPLETE CBC W/AUTO DIFF WBC: CPT

## 2019-06-11 PROCEDURE — 99217 PR OBSERVATION CARE DISCHARGE: CPT | Performed by: INTERNAL MEDICINE

## 2019-06-11 PROCEDURE — 700105 HCHG RX REV CODE 258: Performed by: NURSE PRACTITIONER

## 2019-06-11 PROCEDURE — A9270 NON-COVERED ITEM OR SERVICE: HCPCS | Performed by: NURSE PRACTITIONER

## 2019-06-11 PROCEDURE — 700102 HCHG RX REV CODE 250 W/ 637 OVERRIDE(OP): Performed by: INTERNAL MEDICINE

## 2019-06-11 RX ORDER — ONDANSETRON 4 MG/1
4 TABLET, ORALLY DISINTEGRATING ORAL EVERY 4 HOURS PRN
Qty: 10 TAB | Refills: 0 | Status: SHIPPED | OUTPATIENT
Start: 2019-06-11 | End: 2019-09-22

## 2019-06-11 RX ORDER — GUAIFENESIN/DEXTROMETHORPHAN 100-10MG/5
5 SYRUP ORAL EVERY 6 HOURS PRN
Qty: 60 ML | Refills: 0 | Status: SHIPPED | OUTPATIENT
Start: 2019-06-11 | End: 2019-06-14

## 2019-06-11 RX ORDER — GUAIFENESIN/DEXTROMETHORPHAN 100-10MG/5
5 SYRUP ORAL EVERY 6 HOURS PRN
Status: DISCONTINUED | OUTPATIENT
Start: 2019-06-11 | End: 2019-06-11 | Stop reason: HOSPADM

## 2019-06-11 RX ORDER — OXYCODONE HCL 20 MG/ML
5-10 CONCENTRATE, ORAL ORAL EVERY 4 HOURS PRN
Qty: 10 ML | Refills: 0 | Status: SHIPPED | OUTPATIENT
Start: 2019-06-11 | End: 2019-06-16

## 2019-06-11 RX ADMIN — OXYCODONE HYDROCHLORIDE 10 MG: 100 SOLUTION ORAL at 13:51

## 2019-06-11 RX ADMIN — MORPHINE SULFATE 2 MG: 4 INJECTION INTRAVENOUS at 11:32

## 2019-06-11 RX ADMIN — PHENOL 1 SPRAY: 1.5 LIQUID ORAL at 00:05

## 2019-06-11 RX ADMIN — LIDOCAINE HYDROCHLORIDE 15 ML: 20 SOLUTION ORAL; TOPICAL at 05:18

## 2019-06-11 RX ADMIN — KETOROLAC TROMETHAMINE 30 MG: 30 INJECTION, SOLUTION INTRAMUSCULAR at 00:05

## 2019-06-11 RX ADMIN — KETOROLAC TROMETHAMINE 30 MG: 30 INJECTION, SOLUTION INTRAMUSCULAR at 05:45

## 2019-06-11 RX ADMIN — PHENOL 1 SPRAY: 1.5 LIQUID ORAL at 05:19

## 2019-06-11 RX ADMIN — OXYCODONE HYDROCHLORIDE 10 MG: 100 SOLUTION ORAL at 09:06

## 2019-06-11 RX ADMIN — SENNOSIDES,DOCUSATE SODIUM 2 TABLET: 8.6; 5 TABLET, FILM COATED ORAL at 05:31

## 2019-06-11 RX ADMIN — GUAIFENESIN AND DEXTROMETHORPHAN 5 ML: 100; 10 SYRUP ORAL at 10:27

## 2019-06-11 RX ADMIN — SODIUM CHLORIDE: 9 INJECTION, SOLUTION INTRAVENOUS at 05:19

## 2019-06-11 ASSESSMENT — PATIENT HEALTH QUESTIONNAIRE - PHQ9
2. FEELING DOWN, DEPRESSED, IRRITABLE, OR HOPELESS: NOT AT ALL
1. LITTLE INTEREST OR PLEASURE IN DOING THINGS: NOT AT ALL
SUM OF ALL RESPONSES TO PHQ9 QUESTIONS 1 AND 2: 0

## 2019-06-11 ASSESSMENT — PAIN SCALES - WONG BAKER: WONGBAKER_NUMERICALRESPONSE: HURTS A WHOLE LOT

## 2019-06-11 NOTE — PROGRESS NOTES
Received in bed, aox4, 8/10 dull  throat pain.  Assessment as per CDU. Call light within reach. Needs attended. Plan of care discussed and understood.

## 2019-06-11 NOTE — PROGRESS NOTES
Accompanied to the bathroom with a steady gait, with complaints that her urine is dark red, denies menstruating for now.  Urine is concentrated, instructed the patient to increase her fluid intake and aggressive oral care so she can tolerate po fluids.

## 2019-06-11 NOTE — DISCHARGE SUMMARY
Discharge Summary    CHIEF COMPLAINT ON ADMISSION  Chief Complaint   Patient presents with   • Post Op Bleeding     post tonsillectomy, bleeding, pain     Reason for Admission  Throat pain     Admission Date  6/9/2019    CODE STATUS  Full Code    HPI & HOSPITAL COURSE  Ms. Kwong is a 38-year-old female who presented to the emergency department on 6/9/2019 with severe postop throat pain following a tonsillectomy that day by Dr. James Pina.  She has been unable to tolerate any oral intake and was having difficulty swallowing because of pain.  She also had a dry cough with mild shortness of breath and a mild headache.  She was prescribed postop codeine which was unsuccessful at relieving her pain.  A CT of the neck showed enlargement of the uvula but was otherwise unremarkable.  Procalcitonin was negative, chest x-ray showed possible bronchitis.  ENT was consulted and she was started on IV fluids, analgesia, and viscous lidocaine, which have improved her symptoms enough to where she is tolerating a clear liquid diet without difficulty.  Her vital signs and lab work have remained stable and she is medically clear for discharge today with close follow-up with her ENT.     Therefore, she is discharged in good and stable condition to home with close outpatient follow-up.    Discharge Date  6/11/2019    FOLLOW UP ITEMS POST DISCHARGE  PCP within 1 to 2 weeks.  ENT Dr. Pina within 1 week or sooner if needed.    DISCHARGE DIAGNOSES  Active Problems:    Post-tonsillectomy throat pain POA: Yes    Cough POA: Yes    Oral phase dysphagia, secondary to surgical procedure POA: Yes  Resolved Problems:    Leukocytosis POA: Yes    Dehydration POA: Yes    Tachycardia POA: Yes    FOLLOW UP  Mayo Domínguez M.D.  [id number]  [facility] [address]  [phone]    Schedule an appointment as soon as possible for a visit in 1 week    MEDICATIONS ON DISCHARGE     Medication List      START taking these medications       Instructions   guaiFENesin dextromethorphan 100-10 MG/5ML Syrp syrup  Commonly known as:  ROBITUSSIN DM   Take 5 mL by mouth every 6 hours as needed for Cough for up to 3 days.  Dose:  5 mL     lidocaine viscous 2% 2 % Soln  Commonly known as:  XYLOCAINE   Take 15 mL by mouth every 6 hours as needed for Throat/Mouth Pain.  Dose:  15 mL     ondansetron 4 MG Tbdp  Commonly known as:  ZOFRAN ODT   Take 1 Tab by mouth every four hours as needed for Nausea.  Dose:  4 mg     oxyCODONE 20 MG/ML Conc   Take 0.25-0.5 mL by mouth every four hours as needed for up to 5 days.  Dose:  5-10 mg        STOP taking these medications    HYDROcodone-acetaminophen 7.5-325 MG per tablet  Commonly known as:  NORCO          Allergies  Allergies   Allergen Reactions   • Shellfish Allergy Anaphylaxis, Hives and Shortness of Breath     DIET  Orders Placed This Encounter   Procedures   • Diet Order Clear Liquid     Standing Status:   Standing     Number of Occurrences:   1     Order Specific Question:   Diet:     Answer:   Clear Liquid [10]     ACTIVITY  As tolerated.  Exercise encouraged.  Weight bearing as tolerated    CONSULTATIONS  ENT    PROCEDURES  None (previously had tonsillectomy as described above)    LABORATORY  Lab Results   Component Value Date    SODIUM 142 06/11/2019    POTASSIUM 4.0 06/11/2019    CHLORIDE 111 06/11/2019    CO2 24 06/11/2019    GLUCOSE 86 06/11/2019    BUN 19 06/11/2019    CREATININE 0.46 (L) 06/11/2019      Lab Results   Component Value Date    WBC 10.7 06/11/2019    HEMOGLOBIN 13.0 06/11/2019    HEMATOCRIT 40.2 06/11/2019    PLATELETCT 271 06/11/2019      Total time of the discharge process exceeds 32 minutes.

## 2019-06-11 NOTE — DISCHARGE INSTRUCTIONS
Discharge Instructions    Discharged to home by car with relative. Discharged via walking, hospital escort: Yes.  Special equipment needed: Not Applicable    Be sure to schedule a follow-up appointment with your primary care doctor or any specialists as instructed.     Discharge Plan:   Diet Plan: Discussed  Activity Level: Discussed  Confirmed Follow up Appointment: Patient to Call and Schedule Appointment  Confirmed Symptoms Management: Discussed  Medication Reconciliation Updated: Yes  Influenza Vaccine Indication: Indicated: Not available from distributor/    I understand that a diet low in cholesterol, fat, and sodium is recommended for good health. Unless I have been given specific instructions below for another diet, I accept this instruction as my diet prescription.   Other diet: Advance as tolerated     Special Instructions: None    · Is patient discharged on Warfarin / Coumadin?   No     Depression / Suicide Risk    As you are discharged from this [facility] facility, it is important to learn how to keep safe from harming yourself.    Recognize the warning signs:  · Abrupt changes in personality, positive or negative- including increase in energy   · Giving away possessions  · Change in eating patterns- significant weight changes-  positive or negative  · Change in sleeping patterns- unable to sleep or sleeping all the time   · Unwillingness or inability to communicate  · Depression  · Unusual sadness, discouragement and loneliness  · Talk of wanting to die  · Neglect of personal appearance   · Rebelliousness- reckless behavior  · Withdrawal from people/activities they love  · Confusion- inability to concentrate     If you or a loved one observes any of these behaviors or has concerns about self-harm, here's what you can do:  · Talk about it- your feelings and reasons for harming yourself  · Remove any means that you might use to hurt yourself (examples: pills, rope, extension cords,  firearm)  · Get professional help from the community (Mental Health, Substance Abuse, psychological counseling)  · Do not be alone:Call your Safe Contact- someone whom you trust who will be there for you.  · Call your local CRISIS HOTLINE 138-5159 or 525-522-2380  · Call your local Children's Mobile Crisis Response Team [place] (635) 331-3266 or www.[employer]  · Call the toll free National Suicide Prevention Hotlines   · National Suicide Prevention Lifeline 422-594-ACSQ (1128)  · National Hope Line Network 800-SUICIDE (302-4129)

## 2019-06-11 NOTE — PROGRESS NOTES
Pt coughed scant amount of blood tinged onto paper towel.   Dr. Arita made aware. No new orders  Pt advised to seek medical care of it occurs again, pt agrees to care of plan

## 2019-06-20 ENCOUNTER — TELEPHONE (OUTPATIENT)
Dept: SCHEDULING | Facility: IMAGING CENTER | Age: 39
End: 2019-06-20

## 2019-06-25 ENCOUNTER — OFFICE VISIT (OUTPATIENT)
Dept: MEDICAL GROUP | Facility: PHYSICIAN GROUP | Age: 39
End: 2019-06-25
Payer: COMMERCIAL

## 2019-06-25 VITALS
BODY MASS INDEX: 30.14 KG/M2 | HEIGHT: 61 IN | SYSTOLIC BLOOD PRESSURE: 102 MMHG | DIASTOLIC BLOOD PRESSURE: 74 MMHG | WEIGHT: 159.61 LBS | TEMPERATURE: 96.8 F | RESPIRATION RATE: 14 BRPM | HEART RATE: 69 BPM | OXYGEN SATURATION: 98 %

## 2019-06-25 DIAGNOSIS — Z12.39 SCREENING FOR BREAST CANCER: ICD-10-CM

## 2019-06-25 DIAGNOSIS — Z76.89 ENCOUNTER TO ESTABLISH CARE: ICD-10-CM

## 2019-06-25 DIAGNOSIS — K59.00 CONSTIPATION, UNSPECIFIED CONSTIPATION TYPE: ICD-10-CM

## 2019-06-25 DIAGNOSIS — R10.31 RIGHT LOWER QUADRANT PAIN: ICD-10-CM

## 2019-06-25 DIAGNOSIS — R68.83 CHILLS (WITHOUT FEVER): ICD-10-CM

## 2019-06-25 DIAGNOSIS — Z00.00 ANNUAL PHYSICAL EXAM: ICD-10-CM

## 2019-06-25 DIAGNOSIS — R10.9 STOMACH PAIN: ICD-10-CM

## 2019-06-25 PROCEDURE — 99204 OFFICE O/P NEW MOD 45 MIN: CPT | Performed by: NURSE PRACTITIONER

## 2019-06-25 RX ORDER — DOCUSATE SODIUM 100 MG/1
100 CAPSULE, LIQUID FILLED ORAL 2 TIMES DAILY
Qty: 60 CAP | Refills: 2 | Status: SHIPPED | OUTPATIENT
Start: 2019-06-25 | End: 2019-09-22

## 2019-06-25 ASSESSMENT — PATIENT HEALTH QUESTIONNAIRE - PHQ9: CLINICAL INTERPRETATION OF PHQ2 SCORE: 0

## 2019-06-25 NOTE — ASSESSMENT & PLAN NOTE
This is a new issue to this provider.  Patient reports right lower abdominal pain on and off, comes on randomly a few times daily.  Patient states feels like gas pain.  History of constipation.  Patient reports constipation after her last child was delivered 3 years ago.  She admits to not adequate water intake, and her diet may not have enough fiber.  She reports diarrhea for 3 days 1 week ago which is resolved now.  For the last week constipation.  No treatment tried.  Patient reports colonoscopy done a few years ago, patient diagnosed with internal hemorrhoids.  She denies blood in stool today.

## 2019-06-26 ENCOUNTER — HOSPITAL ENCOUNTER (OUTPATIENT)
Dept: LAB | Facility: MEDICAL CENTER | Age: 39
End: 2019-06-26
Attending: NURSE PRACTITIONER
Payer: COMMERCIAL

## 2019-06-26 DIAGNOSIS — Z00.00 ANNUAL PHYSICAL EXAM: ICD-10-CM

## 2019-06-26 LAB
25(OH)D3 SERPL-MCNC: 14 NG/ML (ref 30–100)
ALBUMIN SERPL BCP-MCNC: 4.1 G/DL (ref 3.2–4.9)
ALBUMIN/GLOB SERPL: 1.3 G/DL
ALP SERPL-CCNC: 87 U/L (ref 30–99)
ALT SERPL-CCNC: 31 U/L (ref 2–50)
ANION GAP SERPL CALC-SCNC: 6 MMOL/L (ref 0–11.9)
AST SERPL-CCNC: 22 U/L (ref 12–45)
BASOPHILS # BLD AUTO: 1.9 % (ref 0–1.8)
BASOPHILS # BLD: 0.09 K/UL (ref 0–0.12)
BILIRUB SERPL-MCNC: 0.5 MG/DL (ref 0.1–1.5)
BUN SERPL-MCNC: 9 MG/DL (ref 8–22)
CALCIUM SERPL-MCNC: 9.2 MG/DL (ref 8.5–10.5)
CHLORIDE SERPL-SCNC: 103 MMOL/L (ref 96–112)
CHOLEST SERPL-MCNC: 125 MG/DL (ref 100–199)
CO2 SERPL-SCNC: 26 MMOL/L (ref 20–33)
CREAT SERPL-MCNC: 0.62 MG/DL (ref 0.5–1.4)
EOSINOPHIL # BLD AUTO: 0.25 K/UL (ref 0–0.51)
EOSINOPHIL NFR BLD: 5.2 % (ref 0–6.9)
ERYTHROCYTE [DISTWIDTH] IN BLOOD BY AUTOMATED COUNT: 42 FL (ref 35.9–50)
EST. AVERAGE GLUCOSE BLD GHB EST-MCNC: 111 MG/DL
FASTING STATUS PATIENT QL REPORTED: NORMAL
GLOBULIN SER CALC-MCNC: 3.2 G/DL (ref 1.9–3.5)
GLUCOSE SERPL-MCNC: 87 MG/DL (ref 65–99)
HBA1C MFR BLD: 5.5 % (ref 0–5.6)
HCT VFR BLD AUTO: 44.3 % (ref 37–47)
HDLC SERPL-MCNC: 41 MG/DL
HGB BLD-MCNC: 14.1 G/DL (ref 12–16)
IMM GRANULOCYTES # BLD AUTO: 0.01 K/UL (ref 0–0.11)
IMM GRANULOCYTES NFR BLD AUTO: 0.2 % (ref 0–0.9)
LDLC SERPL CALC-MCNC: 63 MG/DL
LYMPHOCYTES # BLD AUTO: 1.88 K/UL (ref 1–4.8)
LYMPHOCYTES NFR BLD: 39.1 % (ref 22–41)
MCH RBC QN AUTO: 29.2 PG (ref 27–33)
MCHC RBC AUTO-ENTMCNC: 31.8 G/DL (ref 33.6–35)
MCV RBC AUTO: 91.7 FL (ref 81.4–97.8)
MONOCYTES # BLD AUTO: 0.33 K/UL (ref 0–0.85)
MONOCYTES NFR BLD AUTO: 6.9 % (ref 0–13.4)
NEUTROPHILS # BLD AUTO: 2.25 K/UL (ref 2–7.15)
NEUTROPHILS NFR BLD: 46.7 % (ref 44–72)
NRBC # BLD AUTO: 0 K/UL
NRBC BLD-RTO: 0 /100 WBC
PLATELET # BLD AUTO: 354 K/UL (ref 164–446)
PMV BLD AUTO: 10.7 FL (ref 9–12.9)
POTASSIUM SERPL-SCNC: 4.1 MMOL/L (ref 3.6–5.5)
PROT SERPL-MCNC: 7.3 G/DL (ref 6–8.2)
RBC # BLD AUTO: 4.83 M/UL (ref 4.2–5.4)
SODIUM SERPL-SCNC: 135 MMOL/L (ref 135–145)
T4 FREE SERPL-MCNC: 0.92 NG/DL (ref 0.53–1.43)
TRIGL SERPL-MCNC: 103 MG/DL (ref 0–149)
TSH SERPL DL<=0.005 MIU/L-ACNC: 2.73 UIU/ML (ref 0.38–5.33)
WBC # BLD AUTO: 4.8 K/UL (ref 4.8–10.8)

## 2019-06-26 PROCEDURE — 83036 HEMOGLOBIN GLYCOSYLATED A1C: CPT

## 2019-06-26 PROCEDURE — 80053 COMPREHEN METABOLIC PANEL: CPT

## 2019-06-26 PROCEDURE — 82306 VITAMIN D 25 HYDROXY: CPT

## 2019-06-26 PROCEDURE — 84443 ASSAY THYROID STIM HORMONE: CPT

## 2019-06-26 PROCEDURE — 36415 COLL VENOUS BLD VENIPUNCTURE: CPT

## 2019-06-26 PROCEDURE — 84439 ASSAY OF FREE THYROXINE: CPT

## 2019-06-26 PROCEDURE — 80061 LIPID PANEL: CPT

## 2019-06-26 PROCEDURE — 85025 COMPLETE CBC W/AUTO DIFF WBC: CPT

## 2019-06-26 NOTE — ASSESSMENT & PLAN NOTE
This is a new problem.  Patient reports chills without fever at night since her tonsillectomy done on 6/9/2019.  She denies sore throat.  Patient denies shortness of breath, chest pain, difficulty swallowing, upper respiratory symptoms.  She states after hospital discharge she feels more tired and fatigued.  She states she reports multiple layers of blankets at night which did not help.  This is not resolved.  No unexpected change in weight.

## 2019-06-26 NOTE — PROGRESS NOTES
Chief Complaint   Patient presents with   • Hospital Follow-up     tonsilectomy, dizziness, chills       HISTORY OF PRESENT ILLNESS: Patient is a 38 y.o. female, new  patient who presents today to discuss medical problems as listed below:    Health Maintenance:  COMPLETED. mammo ordered.    Right lower quadrant pain  This is a new issue to this provider.  Patient reports right lower abdominal pain on and off, comes on randomly a few times daily.  Patient states feels like gas pain.  History of constipation.  Patient reports constipation after her last child was delivered 3 years ago.  She admits to not adequate water intake, and her diet may not have enough fiber.  She reports diarrhea for 3 days 1 week ago which is resolved now.  For the last week constipation.  No treatment tried.  Patient reports colonoscopy done a few years ago, patient diagnosed with internal hemorrhoids.  She denies blood in stool today.    Constipation  This is a new issue to this provider.  Patient reports right lower abdominal pain on and off, comes on randomly a few times daily.  Patient states feels like gas pain.  History of constipation.  Patient reports constipation after her last child was delivered 3 years ago.  She admits to not adequate water intake, and her diet may not have enough fiber.  She reports diarrhea for 3 days 1 week ago which is resolved now.  For the last week constipation.  No treatment tried.  Patient reports colonoscopy done a few years ago, patient diagnosed with internal hemorrhoids.  She denies blood in stool today.      Chills (without fever)  This is a new problem.  Patient reports chills without fever at night since her tonsillectomy done on 6/9/2019.  She denies sore throat.  Patient denies shortness of breath, chest pain, difficulty swallowing, upper respiratory symptoms.  She states after hospital discharge she feels more tired and fatigued.  She states she reports multiple layers of blankets at night  which did not help.  This is not resolved.  No unexpected change in weight.      Patient Active Problem List    Diagnosis Date Noted   • Post-tonsillectomy throat pain 06/09/2019     Priority: High   • Encounter to establish care 06/25/2019   • Annual physical exam 06/25/2019   • Screening for breast cancer 06/25/2019   • Right lower quadrant pain 06/25/2019   • Constipation 06/25/2019   • Chills (without fever) 06/25/2019   • Oral phase dysphagia, secondary to surgical procedure 06/11/2019   • Cough 06/09/2019   • S/P latrell 12/21/2013   • Stomach pain 12/21/2013        Allergies: Shellfish allergy    Current Outpatient Prescriptions   Medication Sig Dispense Refill   • docusate sodium (COLACE) 100 MG Cap Take 1 Cap by mouth 2 times a day. 60 Cap 2   • ondansetron (ZOFRAN ODT) 4 MG TABLET DISPERSIBLE Take 1 Tab by mouth every four hours as needed for Nausea. (Patient not taking: Reported on 6/25/2019) 10 Tab 0   • lidocaine viscous 2% (XYLOCAINE) 2 % Solution Take 15 mL by mouth every 6 hours as needed for Throat/Mouth Pain. (Patient not taking: Reported on 6/25/2019) 180 mL 0     No current facility-administered medications for this visit.        Social History   Substance Use Topics   • Smoking status: Passive Smoke Exposure - Never Smoker   • Smokeless tobacco: Never Used   • Alcohol use No     Social History     Social History Narrative    ** Merged History Encounter **         ** Merged History Encounter **            Family History   Problem Relation Age of Onset   • Hypertension Mother    • Diabetes Maternal Grandmother        Allergies, past medical history, past surgical history, family history, social history reviewed and updated.    Review of Systems:      - Constitutional: Positive for chills and general fatigue.  Negative for fever, unexpected weight change.    - HEENT: Negative for headaches, vision changes, hearing changes, ear pain, ear discharge, rhinorrhea, sinus congestion, and neck pain.      -  "Respiratory: Negative for cough, sputum production, chest congestion, dyspnea, wheezing, and crackles.      - Cardiovascular: Negative for chest pain, palpitations, orthopnea, and bilateral lower extremity edema.     - Gastrointestinal: Constipation for the last week, diarrhea 3 days prior to that.  Negative for heartburn, nausea, vomiting, abdominal pain, hematochezia, melena, and greasy/foul-smelling stools.     - Genitourinary: Negative for dysuria, polyuria, hematuria, pyuria, urinary urgency, and urinary incontinence.    - Musculoskeletal: Negative for myalgias, back pain, and joint pain.     - Skin: Negative for rash, itching, cyanotic skin color change.     - Neurological: Negative for dizziness, tingling, tremors, focal sensory deficit, focal weakness and headaches.     - Endo/Heme/Allergies: Does not bruise/bleed easily.     - Psychiatric/Behavioral: Negative for depression, suicidal/homicidal ideation and memory loss.      All other systems reviewed and are negative    Exam:    /74 (BP Location: Left arm, Patient Position: Sitting, BP Cuff Size: Adult)   Pulse 69   Temp 36 °C (96.8 °F) (Temporal)   Resp 14   Ht 1.549 m (5' 1\")   Wt 72.4 kg (159 lb 9.8 oz)   LMP 11/30/2018   SpO2 98%   BMI 30.16 kg/m²  Body mass index is 30.16 kg/m².    Physical Exam:  Constitutional: Well-developed and well-nourished. Not diaphoretic. No distress.   Skin: Skin is warm and dry. No rash noted.  Head: Atraumatic without lesions.  Eyes: Conjunctivae and extraocular motions are normal. Pupils are equal, round, and reactive to light. No scleral icterus.   Ears:  External ears unremarkable. Tympanic membranes clear and intact.  Nose: Nares patent. Septum midline. Turbinates without erythema nor edema. No discharge.   Mouth/Throat: Dentition is normal. Tongue normal. Oropharynx is clear and moist. Posterior pharynx without erythema or exudates.  Neck: Supple, trachea midline. Normal range of motion. No thyromegaly " present. No lymphadenopathy--cervical or supraclavicular.  Cardiovascular: Regular rate and rhythm, S1 and S2 without murmur, rubs, or gallops.    Chest: Effort normal. Clear to auscultation throughout. No adventitious sounds. No CVA tenderness.  Abdomen: RLQ tenderness.  Soft, and without distention. Active bowel sounds in all four quadrants. No rebound, guarding, masses or HSM.  : Negative for dysuria, polyuria, hematuria, pyuria, urinary urgency, and urinary incontinence.  Extremities: No cyanosis, clubbing, erythema, nor edema. Distal pulses intact and symmetric.   Musculoskeletal: All major joints AROM full in all directions without pain.  Neurological: Alert and oriented x 3. DTRs 2+/3 and symmetric. Psychiatric:  Behavior, mood, and affect are appropriate.    Assessment/Plan:  1. Encounter to establish care    2. Annual physical exam  - CBC WITH DIFFERENTIAL; Future  - Comp Metabolic Panel; Future  - FREE THYROXINE; Future  - HEMOGLOBIN A1C; Future  - Lipid Profile; Future  - TSH; Future  - VITAMIN D,25 HYDROXY; Future    3. Screening for breast cancer  - MA-SCREENING MAMMO BILAT W/TOMOSYNTHESIS W/CAD; Future    4. Right lower quadrant pain  Uncontrolled.  Will review ultrasound.  Suspecting constipation, instructions to resolve constipation with OTC treatment discussed in detail.  - US-ABDOMEN COMPLETE SURVEY; Future    5. Constipation, unspecified constipation type  Uncontrolled.  OTC MiraLAX, discussed dietary changes to include more fiber, try smoothies, increase hydration to 2 L of warm fluids, avoid sweets and simple carbohydrates.  Take OTC magnesium as needed.  - docusate sodium (COLACE) 100 MG Cap; Take 1 Cap by mouth 2 times a day.  Dispense: 60 Cap; Refill: 2    7. Chills (without fever)  Uncontrolled.  Over-the-counter vitamin C, zinc.  Utilize supportive care to support immune system, such as rest, increase hydration, avoid simple carbohydrates and sweet foods.  Utilize organic herbal  decaffeinated teas such as chamomile.    Discussed with patient possible alternative diagnoses, pt is to take all medications as prescribed. If symptoms persist FU w/PCP, if symptoms worsen go to emergency room.  Reviewed indication, dosage, usage and potential adverse effects of prescribed medications. Reviewed risks and benefits of treatment plan. Patient verbalizes understanding of all instruction and verbally agrees to plan.    Return in about 2 weeks (around 7/9/2019), or if symptoms worsen or fail to improve.

## 2019-06-27 DIAGNOSIS — E55.9 VITAMIN D DEFICIENCY: ICD-10-CM

## 2019-06-27 RX ORDER — ERGOCALCIFEROL 1.25 MG/1
50000 CAPSULE ORAL
Qty: 12 CAP | Refills: 0 | Status: SHIPPED | OUTPATIENT
Start: 2019-06-27 | End: 2019-07-09 | Stop reason: SDUPTHER

## 2019-06-28 ENCOUNTER — TELEPHONE (OUTPATIENT)
Dept: MEDICAL GROUP | Facility: PHYSICIAN GROUP | Age: 39
End: 2019-06-28

## 2019-06-28 NOTE — TELEPHONE ENCOUNTER
----- Message from LAURENT Landry sent at 6/27/2019 10:43 AM PDT -----  Please let patient know I reviewed her recent labs, noted vitamin D at 14 (normal values are ).  Rx was written for vitamin D, sent to her pharmacy.  After the completion of Rx, OTC vitamin D 2000 mg daily.  Thank you

## 2019-07-09 ENCOUNTER — OFFICE VISIT (OUTPATIENT)
Dept: MEDICAL GROUP | Facility: PHYSICIAN GROUP | Age: 39
End: 2019-07-09

## 2019-07-09 VITALS
OXYGEN SATURATION: 96 % | HEART RATE: 72 BPM | BODY MASS INDEX: 30.2 KG/M2 | DIASTOLIC BLOOD PRESSURE: 80 MMHG | TEMPERATURE: 98.4 F | WEIGHT: 159.83 LBS | SYSTOLIC BLOOD PRESSURE: 108 MMHG | RESPIRATION RATE: 16 BRPM

## 2019-07-09 DIAGNOSIS — E55.9 VITAMIN D DEFICIENCY: ICD-10-CM

## 2019-07-09 PROCEDURE — 99214 OFFICE O/P EST MOD 30 MIN: CPT | Performed by: NURSE PRACTITIONER

## 2019-07-09 RX ORDER — ERGOCALCIFEROL 1.25 MG/1
50000 CAPSULE ORAL
Qty: 12 CAP | Refills: 0 | Status: SHIPPED | OUTPATIENT
Start: 2019-07-09 | End: 2019-09-22

## 2019-07-09 NOTE — LETTER
Replaced by [facility]  LAURENT Landry  [address]  [facility] [address]  Fax: [phone]   Authorization for Release/Disclosure of   Protected Health Information   Name: VICKIE KWONG : 1980 SSN: [id number]   Address: [address]  [facility] [address] Phone:    [phone] (home)    I authorize the entity listed below to release/disclose the PHI below to:   Replaced by [facility]/LAURENT Landry and LAURENT Landry   Provider or Entity Name:     Address   City, State, Zip   Phone:      Fax:     Reason for request: continuity of care   Information to be released:    [  ] LAST COLONOSCOPY,  including any PATH REPORT and follow-up  [  ] LAST FIT/COLOGUARD RESULT [  ] LAST DEXA  [  ] LAST MAMMOGRAM  [  ] LAST PAP  [  ] LAST LABS [  ] RETINA EXAM REPORT  [  ] IMMUNIZATION RECORDS  [  ] Release all info      [  ] Check here and initial the line next to each item to release ALL health information INCLUDING  _____ Care and treatment for drug and / or alcohol abuse  _____ HIV testing, infection status, or AIDS  _____ Genetic Testing    DATES OF SERVICE OR TIME PERIOD TO BE DISCLOSED: _____________  I understand and acknowledge that:  * This Authorization may be revoked at any time by you in writing, except if your health information has already been used or disclosed.  * Your health information that will be used or disclosed as a result of you signing this authorization could be re-disclosed by the recipient. If this occurs, your re-disclosed health information may no longer be protected by State or Federal laws.  * You may refuse to sign this Authorization. Your refusal will not affect your ability to obtain treatment.  * This Authorization becomes effective upon signing and will  on (date) __________.      If no date is indicated, this Authorization will  one (1) year from the signature date.    Name: Vickie Kwong    Signature:   Date:     2019       PLEASE  FAX REQUESTED RECORDS BACK TO: [phone]

## 2019-07-09 NOTE — LETTER
[facility]  LAURENT Landry  [address]  [facility] [address]  Fax: [phone]   Authorization for Release/Disclosure of   Protected Health Information   Name: VICKIE KWONG : 1980 SSN: [id number]   Address: [address]  [facility] [address] Phone:    [phone] (home)    I authorize the entity listed below to release/disclose the PHI below to:   [facility]/LAURENT Landry and LAURENT Landry   Provider or Entity Name:     Address   City, State, Zip   Phone:      Fax:     Reason for request: continuity of care   Information to be released:    [  ] LAST COLONOSCOPY,  including any PATH REPORT and follow-up  [  ] LAST FIT/COLOGUARD RESULT [  ] LAST DEXA  [  ] LAST MAMMOGRAM  [  ] LAST PAP  [  ] LAST LABS [  ] RETINA EXAM REPORT  [  ] IMMUNIZATION RECORDS  [  ] Release all info      [  ] Check here and initial the line next to each item to release ALL health information INCLUDING  _____ Care and treatment for drug and / or alcohol abuse  _____ HIV testing, infection status, or AIDS  _____ Genetic Testing    DATES OF SERVICE OR TIME PERIOD TO BE DISCLOSED: _____________  I understand and acknowledge that:  * This Authorization may be revoked at any time by you in writing, except if your health information has already been used or disclosed.  * Your health information that will be used or disclosed as a result of you signing this authorization could be re-disclosed by the recipient. If this occurs, your re-disclosed health information may no longer be protected by State or Federal laws.  * You may refuse to sign this Authorization. Your refusal will not affect your ability to obtain treatment.  * This Authorization becomes effective upon signing and will  on (date) __________.      If no date is indicated, this Authorization will  one (1) year from the signature date.    Name: Vickie Kwong    Signature:   Date:     2019       PLEASE  FAX REQUESTED RECORDS BACK TO: [phone]

## 2019-07-10 NOTE — ASSESSMENT & PLAN NOTE
Reviewed labs from 6/26/2019, noted vitamin D level low at 14.   Ref. Range 6/26/2019 14:04   25-Hydroxy   Vitamin D 25 Latest Ref Range: 30 - 100 ng/mL 14 (L)

## 2019-07-10 NOTE — PROGRESS NOTES
Chief Complaint   Patient presents with   • Follow-Up     lab review       HISTORY OF PRESENT ILLNESS: Patient is a 38 y.o. female, established patient who presents today to discuss medical problems as listed below:    Vitamin D deficiency    Reviewed labs from 6/26/2019, noted vitamin D level low at 14.   Ref. Range 6/26/2019 14:04   25-Hydroxy   Vitamin D 25 Latest Ref Range: 30 - 100 ng/mL 14 (L)       Patient Active Problem List    Diagnosis Date Noted   • Post-tonsillectomy throat pain 06/09/2019     Priority: High   • Vitamin D deficiency 06/27/2019   • Encounter to establish care 06/25/2019   • Annual physical exam 06/25/2019   • Screening for breast cancer 06/25/2019   • Right lower quadrant pain 06/25/2019   • Constipation 06/25/2019   • Chills (without fever) 06/25/2019   • Oral phase dysphagia, secondary to surgical procedure 06/11/2019   • Cough 06/09/2019   • S/P latrell 12/21/2013   • Stomach pain 12/21/2013        Allergies: Shellfish allergy    Current Outpatient Prescriptions   Medication Sig Dispense Refill   • vitamin D, Ergocalciferol, (DRISDOL) 96765 units Cap capsule Take 1 Cap by mouth every 7 days. 12 Cap 0   • docusate sodium (COLACE) 100 MG Cap Take 1 Cap by mouth 2 times a day. 60 Cap 2   • ondansetron (ZOFRAN ODT) 4 MG TABLET DISPERSIBLE Take 1 Tab by mouth every four hours as needed for Nausea. 10 Tab 0   • lidocaine viscous 2% (XYLOCAINE) 2 % Solution Take 15 mL by mouth every 6 hours as needed for Throat/Mouth Pain. 180 mL 0   • HYDROcodone-acetaminophen 2.5-108 mg/5mL (HYCET) 7.5-325 MG/15ML solution TAKE 15 ML BY MOUTH EVERY 6 HOURS FOR 7 DAYS  0     No current facility-administered medications for this visit.        Social History   Substance Use Topics   • Smoking status: Passive Smoke Exposure - Never Smoker   • Smokeless tobacco: Never Used   • Alcohol use No     Social History     Social History Narrative    ** Merged History Encounter **         ** Merged History Encounter **             Family History   Problem Relation Age of Onset   • Hypertension Mother    • Diabetes Maternal Grandmother        Allergies, past medical history, past surgical history, family history, social history reviewed and updated.    Review of Systems:      - Constitutional: Negative for fever, chills, unexpected weight change, and fatigue/generalized weakness.       - Respiratory:Negative for cough, sputum production, chest congestion, dyspnea, wheezing, and crackles.      - Cardiovascular: Negative for chest pain, palpitations, orthopnea, and bilateral lower extremity edema.     - Gastrointestinal: Negative for heartburn, nausea, vomiting, abdominal pain, hematochezia, melena, diarrhea, constipation, and greasy/foul-smelling stools.     - Psychiatric/Behavioral: Negative for depression, suicidal/homicidal ideation and memory loss.      All other systems reviewed and are negative    Exam:    /80 (BP Location: Right arm, Patient Position: Sitting, BP Cuff Size: Adult)   Pulse 72   Temp 36.9 °C (98.4 °F) (Temporal)   Resp 16   Wt 72.5 kg (159 lb 13.3 oz)   LMP 11/30/2018   SpO2 96%   BMI 30.20 kg/m²   Body mass index is 30.2 kg/m².    Physical Exam:  Constitutional: Well-developed and well-nourished. Not diaphoretic. No distress.   Cardiovascular: Regular rate and rhythm, S1 and S2 without murmur, rubs, or gallops.    Chest: Effort normal. Clear to auscultation throughout. No adventitious sounds. No CVA tenderness.  Abdomen: Soft, non tender, and without distention. Active bowel sounds in all four quadrants. No rebound, guarding, masses or HSM.  Psychiatric:  Behavior, mood, and affect are appropriate.    LABS: 6/26/29 results reviewed and discussed with the patient, questions answered.    Patient was seen for 25 minutes face to face of which, 20 minutes was spent counseling regarding reviewing labs, healthy lifestyle, including exercise and nutrition.    Assessment/Plan:  1. Vitamin D deficiency  -  vitamin D, Ergocalciferol, (DRISDOL) 44476 units Cap capsule; Take 1 Cap by mouth every 7 days.  Dispense: 12 Cap; Refill: 0    Discussed with patient possible alternative diagnoses, pt is to take all medications as prescribed. If symptoms persist FU w/PCP, if symptoms worsen go to emergency room.  Reviewed indication, dosage, usage and potential adverse effects of prescribed medications. Reviewed risks and benefits of treatment plan. Patient verbalizes understanding of all instruction and verbally agrees to plan.    Return in about 2 years (around 7/9/2021), or if symptoms worsen or fail to improve.

## 2019-09-22 ENCOUNTER — APPOINTMENT (OUTPATIENT)
Dept: RADIOLOGY | Facility: MEDICAL CENTER | Age: 39
DRG: 558 | End: 2019-09-22
Attending: EMERGENCY MEDICINE

## 2019-09-22 ENCOUNTER — HOSPITAL ENCOUNTER (INPATIENT)
Facility: MEDICAL CENTER | Age: 39
LOS: 6 days | DRG: 558 | End: 2019-09-28
Attending: EMERGENCY MEDICINE | Admitting: INTERNAL MEDICINE
Payer: COMMERCIAL

## 2019-09-22 DIAGNOSIS — M25.511 ACUTE PAIN OF RIGHT SHOULDER: ICD-10-CM

## 2019-09-22 DIAGNOSIS — M75.81 ROTATOR CUFF TENDONITIS, RIGHT: ICD-10-CM

## 2019-09-22 DIAGNOSIS — M75.81 ROTATOR CUFF TENDINITIS, RIGHT: ICD-10-CM

## 2019-09-22 LAB
ALBUMIN SERPL BCP-MCNC: 4.3 G/DL (ref 3.2–4.9)
ALBUMIN/GLOB SERPL: 1.4 G/DL
ALP SERPL-CCNC: 99 U/L (ref 30–99)
ALT SERPL-CCNC: 17 U/L (ref 2–50)
ANION GAP SERPL CALC-SCNC: 9 MMOL/L (ref 0–11.9)
AST SERPL-CCNC: 16 U/L (ref 12–45)
BASOPHILS # BLD AUTO: 0.8 % (ref 0–1.8)
BASOPHILS # BLD: 0.07 K/UL (ref 0–0.12)
BILIRUB SERPL-MCNC: 0.3 MG/DL (ref 0.1–1.5)
BODY FLD TYPE: NORMAL
BUN SERPL-MCNC: 10 MG/DL (ref 8–22)
CALCIUM SERPL-MCNC: 9.4 MG/DL (ref 8.5–10.5)
CHLORIDE SERPL-SCNC: 107 MMOL/L (ref 96–112)
CO2 SERPL-SCNC: 25 MMOL/L (ref 20–33)
CREAT SERPL-MCNC: 0.71 MG/DL (ref 0.5–1.4)
CRP SERPL HS-MCNC: 1.71 MG/DL (ref 0–0.75)
CRYSTALS FLD MICRO: NORMAL
EOSINOPHIL # BLD AUTO: 0.29 K/UL (ref 0–0.51)
EOSINOPHIL NFR BLD: 3.2 % (ref 0–6.9)
ERYTHROCYTE [DISTWIDTH] IN BLOOD BY AUTOMATED COUNT: 41.6 FL (ref 35.9–50)
ERYTHROCYTE [SEDIMENTATION RATE] IN BLOOD BY WESTERGREN METHOD: 17 MM/HOUR (ref 0–20)
GLOBULIN SER CALC-MCNC: 3.1 G/DL (ref 1.9–3.5)
GLUCOSE SERPL-MCNC: 103 MG/DL (ref 65–99)
GRAM STN SPEC: NORMAL
HCG SERPL QL: NEGATIVE
HCT VFR BLD AUTO: 46.5 % (ref 37–47)
HGB BLD-MCNC: 15.3 G/DL (ref 12–16)
IMM GRANULOCYTES # BLD AUTO: 0.03 K/UL (ref 0–0.11)
IMM GRANULOCYTES NFR BLD AUTO: 0.3 % (ref 0–0.9)
LYMPHOCYTES # BLD AUTO: 1.93 K/UL (ref 1–4.8)
LYMPHOCYTES NFR BLD: 21 % (ref 22–41)
MCH RBC QN AUTO: 30.5 PG (ref 27–33)
MCHC RBC AUTO-ENTMCNC: 32.9 G/DL (ref 33.6–35)
MCV RBC AUTO: 92.6 FL (ref 81.4–97.8)
MONOCYTES # BLD AUTO: 0.64 K/UL (ref 0–0.85)
MONOCYTES NFR BLD AUTO: 7 % (ref 0–13.4)
NEUTROPHILS # BLD AUTO: 6.24 K/UL (ref 2–7.15)
NEUTROPHILS NFR BLD: 67.7 % (ref 44–72)
NRBC # BLD AUTO: 0 K/UL
NRBC BLD-RTO: 0 /100 WBC
PLATELET # BLD AUTO: 309 K/UL (ref 164–446)
PMV BLD AUTO: 10.5 FL (ref 9–12.9)
POTASSIUM SERPL-SCNC: 3.9 MMOL/L (ref 3.6–5.5)
PROT SERPL-MCNC: 7.4 G/DL (ref 6–8.2)
RBC # BLD AUTO: 5.02 M/UL (ref 4.2–5.4)
SIGNIFICANT IND 70042: NORMAL
SITE SITE: NORMAL
SODIUM SERPL-SCNC: 141 MMOL/L (ref 135–145)
SOURCE SOURCE: NORMAL
WBC # BLD AUTO: 9.2 K/UL (ref 4.8–10.8)

## 2019-09-22 PROCEDURE — 89060 EXAM SYNOVIAL FLUID CRYSTALS: CPT

## 2019-09-22 PROCEDURE — 86140 C-REACTIVE PROTEIN: CPT

## 2019-09-22 PROCEDURE — 700101 HCHG RX REV CODE 250: Performed by: EMERGENCY MEDICINE

## 2019-09-22 PROCEDURE — 99223 1ST HOSP IP/OBS HIGH 75: CPT | Mod: GC | Performed by: INTERNAL MEDICINE

## 2019-09-22 PROCEDURE — 99285 EMERGENCY DEPT VISIT HI MDM: CPT

## 2019-09-22 PROCEDURE — 96376 TX/PRO/DX INJ SAME DRUG ADON: CPT

## 2019-09-22 PROCEDURE — 96374 THER/PROPH/DIAG INJ IV PUSH: CPT

## 2019-09-22 PROCEDURE — 700111 HCHG RX REV CODE 636 W/ 250 OVERRIDE (IP): Performed by: EMERGENCY MEDICINE

## 2019-09-22 PROCEDURE — 770006 HCHG ROOM/CARE - MED/SURG/GYN SEMI*

## 2019-09-22 PROCEDURE — 73030 X-RAY EXAM OF SHOULDER: CPT | Mod: RT

## 2019-09-22 PROCEDURE — 20610 DRAIN/INJ JOINT/BURSA W/O US: CPT

## 2019-09-22 PROCEDURE — 85652 RBC SED RATE AUTOMATED: CPT

## 2019-09-22 PROCEDURE — 85025 COMPLETE CBC W/AUTO DIFF WBC: CPT

## 2019-09-22 PROCEDURE — 700101 HCHG RX REV CODE 250: Performed by: ORTHOPAEDIC SURGERY

## 2019-09-22 PROCEDURE — 700111 HCHG RX REV CODE 636 W/ 250 OVERRIDE (IP): Performed by: STUDENT IN AN ORGANIZED HEALTH CARE EDUCATION/TRAINING PROGRAM

## 2019-09-22 PROCEDURE — 96375 TX/PRO/DX INJ NEW DRUG ADDON: CPT

## 2019-09-22 PROCEDURE — 0R9J3ZX DRAINAGE OF RIGHT SHOULDER JOINT, PERCUTANEOUS APPROACH, DIAGNOSTIC: ICD-10-PCS | Performed by: ORTHOPAEDIC SURGERY

## 2019-09-22 PROCEDURE — 80053 COMPREHEN METABOLIC PANEL: CPT

## 2019-09-22 PROCEDURE — 84703 CHORIONIC GONADOTROPIN ASSAY: CPT

## 2019-09-22 PROCEDURE — 87070 CULTURE OTHR SPECIMN AEROBIC: CPT

## 2019-09-22 PROCEDURE — 87205 SMEAR GRAM STAIN: CPT

## 2019-09-22 RX ORDER — BISACODYL 10 MG
10 SUPPOSITORY, RECTAL RECTAL
Status: DISCONTINUED | OUTPATIENT
Start: 2019-09-22 | End: 2019-09-24

## 2019-09-22 RX ORDER — MORPHINE SULFATE 4 MG/ML
4 INJECTION, SOLUTION INTRAMUSCULAR; INTRAVENOUS ONCE
Status: COMPLETED | OUTPATIENT
Start: 2019-09-22 | End: 2019-09-22

## 2019-09-22 RX ORDER — IBUPROFEN 200 MG
200-400 TABLET ORAL EVERY 8 HOURS PRN
Status: ON HOLD | COMMUNITY
End: 2019-09-26

## 2019-09-22 RX ORDER — POLYETHYLENE GLYCOL 3350 17 G/17G
1 POWDER, FOR SOLUTION ORAL
Status: DISCONTINUED | OUTPATIENT
Start: 2019-09-22 | End: 2019-09-24

## 2019-09-22 RX ORDER — NAPROXEN SODIUM 220 MG
220 TABLET ORAL ONCE
Status: ON HOLD | COMMUNITY
Start: 2019-09-21 | End: 2019-09-26

## 2019-09-22 RX ORDER — KETOROLAC TROMETHAMINE 30 MG/ML
30 INJECTION, SOLUTION INTRAMUSCULAR; INTRAVENOUS ONCE
Status: COMPLETED | OUTPATIENT
Start: 2019-09-22 | End: 2019-09-22

## 2019-09-22 RX ORDER — LIDOCAINE HYDROCHLORIDE 20 MG/ML
20 INJECTION, SOLUTION INFILTRATION; PERINEURAL ONCE
Status: COMPLETED | OUTPATIENT
Start: 2019-09-22 | End: 2019-09-22

## 2019-09-22 RX ORDER — IBUPROFEN 200 MG
200-400 TABLET ORAL EVERY 8 HOURS PRN
Status: DISCONTINUED | OUTPATIENT
Start: 2019-09-22 | End: 2019-09-23

## 2019-09-22 RX ORDER — MORPHINE SULFATE 10 MG/ML
6 INJECTION, SOLUTION INTRAMUSCULAR; INTRAVENOUS ONCE
Status: COMPLETED | OUTPATIENT
Start: 2019-09-22 | End: 2019-09-22

## 2019-09-22 RX ORDER — HYDROCODONE BITARTRATE AND ACETAMINOPHEN 5; 325 MG/1; MG/1
1-2 TABLET ORAL EVERY 6 HOURS PRN
Qty: 10 TAB | Refills: 0 | Status: ON HOLD | OUTPATIENT
Start: 2019-09-22 | End: 2019-09-26

## 2019-09-22 RX ORDER — AMOXICILLIN 250 MG
2 CAPSULE ORAL 2 TIMES DAILY
Status: DISCONTINUED | OUTPATIENT
Start: 2019-09-22 | End: 2019-09-24

## 2019-09-22 RX ORDER — ONDANSETRON 2 MG/ML
4 INJECTION INTRAMUSCULAR; INTRAVENOUS ONCE
Status: COMPLETED | OUTPATIENT
Start: 2019-09-22 | End: 2019-09-22

## 2019-09-22 RX ORDER — ENALAPRILAT 1.25 MG/ML
1.25 INJECTION INTRAVENOUS EVERY 6 HOURS PRN
Status: DISCONTINUED | OUTPATIENT
Start: 2019-09-22 | End: 2019-09-28 | Stop reason: HOSPADM

## 2019-09-22 RX ORDER — ACETAMINOPHEN 325 MG/1
650 TABLET ORAL EVERY 6 HOURS PRN
Status: DISCONTINUED | OUTPATIENT
Start: 2019-09-22 | End: 2019-09-23

## 2019-09-22 RX ORDER — ACETAMINOPHEN 325 MG/1
650 TABLET ORAL EVERY 6 HOURS PRN
Status: ON HOLD | COMMUNITY
End: 2019-09-26

## 2019-09-22 RX ORDER — LIDOCAINE 50 MG/G
1 PATCH TOPICAL ONCE
Status: COMPLETED | OUTPATIENT
Start: 2019-09-22 | End: 2019-09-23

## 2019-09-22 RX ORDER — MORPHINE SULFATE 4 MG/ML
4 INJECTION, SOLUTION INTRAMUSCULAR; INTRAVENOUS EVERY 6 HOURS PRN
Status: DISCONTINUED | OUTPATIENT
Start: 2019-09-22 | End: 2019-09-23

## 2019-09-22 RX ORDER — HEPARIN SODIUM 5000 [USP'U]/ML
5000 INJECTION, SOLUTION INTRAVENOUS; SUBCUTANEOUS EVERY 8 HOURS
Status: DISCONTINUED | OUTPATIENT
Start: 2019-09-22 | End: 2019-09-24

## 2019-09-22 RX ADMIN — LIDOCAINE 1 PATCH: 50 PATCH CUTANEOUS at 16:32

## 2019-09-22 RX ADMIN — MORPHINE SULFATE 4 MG: 4 INJECTION INTRAVENOUS at 16:25

## 2019-09-22 RX ADMIN — MORPHINE SULFATE 6 MG: 10 INJECTION INTRAVENOUS at 18:21

## 2019-09-22 RX ADMIN — KETOROLAC TROMETHAMINE 30 MG: 30 INJECTION, SOLUTION INTRAMUSCULAR; INTRAVENOUS at 16:27

## 2019-09-22 RX ADMIN — ONDANSETRON 4 MG: 2 INJECTION INTRAMUSCULAR; INTRAVENOUS at 16:25

## 2019-09-22 RX ADMIN — MORPHINE SULFATE 4 MG: 4 INJECTION INTRAVENOUS at 21:24

## 2019-09-22 RX ADMIN — LIDOCAINE HYDROCHLORIDE 20 ML: 20 INJECTION, SOLUTION INFILTRATION; PERINEURAL at 18:25

## 2019-09-22 ASSESSMENT — PATIENT HEALTH QUESTIONNAIRE - PHQ9
1. LITTLE INTEREST OR PLEASURE IN DOING THINGS: NOT AT ALL
2. FEELING DOWN, DEPRESSED, IRRITABLE, OR HOPELESS: NOT AT ALL
SUM OF ALL RESPONSES TO PHQ9 QUESTIONS 1 AND 2: 0

## 2019-09-22 ASSESSMENT — LIFESTYLE VARIABLES
TOTAL SCORE: 0
EVER HAD A DRINK FIRST THING IN THE MORNING TO STEADY YOUR NERVES TO GET RID OF A HANGOVER: NO
HAVE YOU EVER FELT YOU SHOULD CUT DOWN ON YOUR DRINKING: NO
TOTAL SCORE: 0
HAVE PEOPLE ANNOYED YOU BY CRITICIZING YOUR DRINKING: NO
EVER HAD A DRINK FIRST THING IN THE MORNING TO STEADY YOUR NERVES TO GET RID OF A HANGOVER: NO
HAVE PEOPLE ANNOYED YOU BY CRITICIZING YOUR DRINKING: NO
DO YOU DRINK ALCOHOL: NO
ON A TYPICAL DAY WHEN YOU DRINK ALCOHOL HOW MANY DRINKS DO YOU HAVE: 1
TOTAL SCORE: 0
AVERAGE NUMBER OF DAYS PER WEEK YOU HAVE A DRINK CONTAINING ALCOHOL: 1
DOES PATIENT WANT TO STOP DRINKING: NO
EVER FELT BAD OR GUILTY ABOUT YOUR DRINKING: NO
CONSUMPTION TOTAL: INCOMPLETE
TOTAL SCORE: 0
TOTAL SCORE: 0
ALCOHOL_USE: YES
CONSUMPTION TOTAL: NEGATIVE
HOW MANY TIMES IN THE PAST YEAR HAVE YOU HAD 5 OR MORE DRINKS IN A DAY: 0
EVER FELT BAD OR GUILTY ABOUT YOUR DRINKING: NO
DOES PATIENT WANT TO STOP DRINKING: NO
HAVE YOU EVER FELT YOU SHOULD CUT DOWN ON YOUR DRINKING: NO
EVER_SMOKED: NEVER
TOTAL SCORE: 0

## 2019-09-22 ASSESSMENT — COGNITIVE AND FUNCTIONAL STATUS - GENERAL
TURNING FROM BACK TO SIDE WHILE IN FLAT BAD: A LITTLE
MOBILITY SCORE: 22
DAILY ACTIVITIY SCORE: 21
DRESSING REGULAR LOWER BODY CLOTHING: A LITTLE
SUGGESTED CMS G CODE MODIFIER DAILY ACTIVITY: CJ
HELP NEEDED FOR BATHING: A LITTLE
STANDING UP FROM CHAIR USING ARMS: A LITTLE
DRESSING REGULAR UPPER BODY CLOTHING: A LITTLE
SUGGESTED CMS G CODE MODIFIER MOBILITY: CJ

## 2019-09-22 NOTE — ED PROVIDER NOTES
ED Provider Note    CHIEF COMPLAINT  Chief Complaint   Patient presents with   • Shoulder Pain     rt, since friday       HPI  Claire Kwong is a 38 y.o. female who presents planing of right shoulder pain since Friday.  The patient denies any falls or trauma and states that throughout the day and throughout this weekend she has progressively worsening pain in her right shoulder.  The pain is so bad now she cannot move her shoulder without severe discomfort.  She has been taking Motrin without any relief in the pain.  She states she feels feverish and chilled though she has not had a documented temperature.  She denies any previous history of injury to the shoulder.  She states that her whole right arm and hand is tingling because of the pain.  She has not noticed any joint swelling.  She has never had symptoms like this in the past.  Patient denies any history of diabetes or neuropathy.  She is very tearful and uncomfortable.    REVIEW OF SYSTEMS  No history of poor wound healing diabetes or bony abnormalities    PAST MEDICAL HISTORY  Past Medical History:   Diagnosis Date   • Hx of ectopic pregnancy x 2 11/10/2011   • Migraine          SOCIAL HISTORY  Social History     Socioeconomic History   • Marital status:      Spouse name: Not on file   • Number of children: Not on file   • Years of education: Not on file   • Highest education level: Not on file   Occupational History   • Not on file   Social Needs   • Financial resource strain: Not on file   • Food insecurity:     Worry: Not on file     Inability: Not on file   • Transportation needs:     Medical: Not on file     Non-medical: Not on file   Tobacco Use   • Smoking status: Passive Smoke Exposure - Never Smoker   • Smokeless tobacco: Never Used   Substance and Sexual Activity   • Alcohol use: No   • Drug use: No   • Sexual activity: Yes     Partners: Male   Lifestyle   • Physical activity:     Days per week: Not on file     Minutes per session: Not on file  "  • Stress: Not on file   Relationships   • Social connections:     Talks on phone: Not on file     Gets together: Not on file     Attends [employer] service: Not on file     Active member of club or organization: Not on file     Attends meetings of clubs or organizations: Not on file     Relationship status: Not on file   • Intimate partner violence:     Fear of current or ex partner: Not on file     Emotionally abused: Not on file     Physically abused: Not on file     Forced sexual activity: Not on file   Other Topics Concern   • Not on file   Social History Narrative    ** Merged History Encounter **         ** Merged History Encounter **            CURRENT MEDICATIONS  Home Medications     Reviewed by Edilma Laurent R.N. (Registered Nurse) on 09/22/19 at 1532  Med List Status: Partial   Medication Last Dose Status   docusate sodium (COLACE) 100 MG Cap  Active   HYDROcodone-acetaminophen 2.5-108 mg/5mL (HYCET) 7.5-325 MG/15ML solution  Active   lidocaine viscous 2% (XYLOCAINE) 2 % Solution  Active   ondansetron (ZOFRAN ODT) 4 MG TABLET DISPERSIBLE  Active   vitamin D, Ergocalciferol, (DRISDOL) 75206 units Cap capsule  Active                ALLERGIES  Allergies   Allergen Reactions   • Shellfish Allergy Anaphylaxis, Hives and Shortness of Breath       PHYSICAL EXAM  VITAL SIGNS: /80   Pulse 68   Temp 36.6 °C (97.9 °F) (Temporal)   Resp 16   Ht 1.575 m (5' 2\")   Wt 73 kg (160 lb 15 oz)   LMP 11/30/2018   SpO2 97%   BMI 29.44 kg/m²    Constitutional: Tearful and uncomfortable  Skin: Pink warm and dry without contusions erythema or abrasions  Musculoskeletal: Severe tenderness to palpation with any type of range of motion of the patient's right shoulder no obvious effusion no elbow pain or wrist pain.  Positive decreased hip strength because of the pain initiates in her right shoulder.  Vascular: warm to touch good capillary refill   Neurologic: distally neurovascularly intact  Psychiatric: Affect " normal    Radiology  DX-SHOULDER 2+ RIGHT   Final Result      Minimal acromioclavicular and glenohumeral osteoarthrosis. No acute osseous abnormality.            Medical Decision Making  Differential diagnosis: Shoulder fracture, myofascial strain, septic joint, arthritic pain      Results for orders placed or performed during the hospital encounter of 09/22/19   CBC WITH DIFFERENTIAL   Result Value Ref Range    WBC 9.2 4.8 - 10.8 K/uL    RBC 5.02 4.20 - 5.40 M/uL    Hemoglobin 15.3 12.0 - 16.0 g/dL    Hematocrit 46.5 37.0 - 47.0 %    MCV 92.6 81.4 - 97.8 fL    MCH 30.5 27.0 - 33.0 pg    MCHC 32.9 (L) 33.6 - 35.0 g/dL    RDW 41.6 35.9 - 50.0 fL    Platelet Count 309 164 - 446 K/uL    MPV 10.5 9.0 - 12.9 fL    Neutrophils-Polys 67.70 44.00 - 72.00 %    Lymphocytes 21.00 (L) 22.00 - 41.00 %    Monocytes 7.00 0.00 - 13.40 %    Eosinophils 3.20 0.00 - 6.90 %    Basophils 0.80 0.00 - 1.80 %    Immature Granulocytes 0.30 0.00 - 0.90 %    Nucleated RBC 0.00 /100 WBC    Neutrophils (Absolute) 6.24 2.00 - 7.15 K/uL    Lymphs (Absolute) 1.93 1.00 - 4.80 K/uL    Monos (Absolute) 0.64 0.00 - 0.85 K/uL    Eos (Absolute) 0.29 0.00 - 0.51 K/uL    Baso (Absolute) 0.07 0.00 - 0.12 K/uL    Immature Granulocytes (abs) 0.03 0.00 - 0.11 K/uL    NRBC (Absolute) 0.00 K/uL   COMP METABOLIC PANEL   Result Value Ref Range    Sodium 141 135 - 145 mmol/L    Potassium 3.9 3.6 - 5.5 mmol/L    Chloride 107 96 - 112 mmol/L    Co2 25 20 - 33 mmol/L    Anion Gap 9.0 0.0 - 11.9    Glucose 103 (H) 65 - 99 mg/dL    Bun 10 8 - 22 mg/dL    Creatinine 0.71 0.50 - 1.40 mg/dL    Calcium 9.4 8.5 - 10.5 mg/dL    AST(SGOT) 16 12 - 45 U/L    ALT(SGPT) 17 2 - 50 U/L    Alkaline Phosphatase 99 30 - 99 U/L    Total Bilirubin 0.3 0.1 - 1.5 mg/dL    Albumin 4.3 3.2 - 4.9 g/dL    Total Protein 7.4 6.0 - 8.2 g/dL    Globulin 3.1 1.9 - 3.5 g/dL    A-G Ratio 1.4 g/dL   [facility] SED RATE   Result Value Ref Range    Sed Rate [facility] 17 0 - 20 mm/hour   CRP QUANTITIVE  (NON-CARDIAC)   Result Value Ref Range    Stat C-Reactive Protein 1.71 (H) 0.00 - 0.75 mg/dL   BETA-HCG QUALITATIVE SERUM   Result Value Ref Range    Beta-Hcg Qualitative Serum Negative Negative   ESTIMATED GFR   Result Value Ref Range    GFR If African American >60 >60 mL/min/1.73 m 2    GFR If Non African American >60 >60 mL/min/1.73 m 2        4:16 PM because of the patient's severe discomfort an IV was started and labs were drawn.  I did order an x-ray of the shoulder and she was given Toradol, morphine and a Lidoderm patch.      5:34 PM upon recheck the patient feels improved however any movement of her right shoulder causes her severe pain.  I am concerned about a septic joint.  I page , orthopedics to consult on the patient's case.  He will come see the patient's and decide whether to tap her shoulder and admit her or send her home.  Dr. Hackett will follow up on the patient's case and make sure she gets home with the appropriate medications that she is being discharged.      In prescribing controlled substances to this patient, I certify that I have obtained and reviewed the medical history of Claire Kwong. I have also made a good adán effort to obtain applicable records from other providers who have treated the patient and records did not demonstrate any increased risk of substance abuse that would prevent me from prescribing controlled substances.     I have conducted a physical exam and documented it. I have reviewed Ms. Kwong’s prescription history as maintained by the [place] Prescription Monitoring Program.     I have assessed the patient’s risk for abuse, dependency, and addiction using the validated Opioid Risk Tool available at https://www.mdcalc.com/oozipm-rorn-tksx-ort-narcotic-abuse.     Given the above, I believe the benefits of controlled substance therapy outweigh the risks. The reasons for prescribing controlled substances include non-narcotic, oral analgesic alternatives have been  inadequate for pain control. Accordingly, I have discussed the risk and benefits, treatment plan, and alternative therapies with the patient.         Diagnosis  1. Acute pain of right shoulder

## 2019-09-23 ENCOUNTER — APPOINTMENT (OUTPATIENT)
Dept: RADIOLOGY | Facility: MEDICAL CENTER | Age: 39
DRG: 558 | End: 2019-09-23
Attending: STUDENT IN AN ORGANIZED HEALTH CARE EDUCATION/TRAINING PROGRAM

## 2019-09-23 LAB
ANION GAP SERPL CALC-SCNC: 6 MMOL/L (ref 0–11.9)
BASOPHILS # BLD AUTO: 0.7 % (ref 0–1.8)
BASOPHILS # BLD: 0.05 K/UL (ref 0–0.12)
BUN SERPL-MCNC: 10 MG/DL (ref 8–22)
CALCIUM SERPL-MCNC: 8.8 MG/DL (ref 8.5–10.5)
CHLORIDE SERPL-SCNC: 110 MMOL/L (ref 96–112)
CO2 SERPL-SCNC: 24 MMOL/L (ref 20–33)
CREAT SERPL-MCNC: 0.63 MG/DL (ref 0.5–1.4)
EOSINOPHIL # BLD AUTO: 0.28 K/UL (ref 0–0.51)
EOSINOPHIL NFR BLD: 3.9 % (ref 0–6.9)
ERYTHROCYTE [DISTWIDTH] IN BLOOD BY AUTOMATED COUNT: 42.4 FL (ref 35.9–50)
GLUCOSE SERPL-MCNC: 103 MG/DL (ref 65–99)
HCT VFR BLD AUTO: 43.4 % (ref 37–47)
HGB BLD-MCNC: 14.1 G/DL (ref 12–16)
IMM GRANULOCYTES # BLD AUTO: 0.02 K/UL (ref 0–0.11)
IMM GRANULOCYTES NFR BLD AUTO: 0.3 % (ref 0–0.9)
LYMPHOCYTES # BLD AUTO: 2.13 K/UL (ref 1–4.8)
LYMPHOCYTES NFR BLD: 29.5 % (ref 22–41)
MCH RBC QN AUTO: 30.1 PG (ref 27–33)
MCHC RBC AUTO-ENTMCNC: 32.5 G/DL (ref 33.6–35)
MCV RBC AUTO: 92.7 FL (ref 81.4–97.8)
MONOCYTES # BLD AUTO: 0.7 K/UL (ref 0–0.85)
MONOCYTES NFR BLD AUTO: 9.7 % (ref 0–13.4)
NEUTROPHILS # BLD AUTO: 4.03 K/UL (ref 2–7.15)
NEUTROPHILS NFR BLD: 55.9 % (ref 44–72)
NRBC # BLD AUTO: 0 K/UL
NRBC BLD-RTO: 0 /100 WBC
PLATELET # BLD AUTO: 260 K/UL (ref 164–446)
PMV BLD AUTO: 10.4 FL (ref 9–12.9)
POTASSIUM SERPL-SCNC: 4.3 MMOL/L (ref 3.6–5.5)
RBC # BLD AUTO: 4.68 M/UL (ref 4.2–5.4)
SODIUM SERPL-SCNC: 140 MMOL/L (ref 135–145)
WBC # BLD AUTO: 7.2 K/UL (ref 4.8–10.8)

## 2019-09-23 PROCEDURE — 87040 BLOOD CULTURE FOR BACTERIA: CPT

## 2019-09-23 PROCEDURE — 700111 HCHG RX REV CODE 636 W/ 250 OVERRIDE (IP): Performed by: STUDENT IN AN ORGANIZED HEALTH CARE EDUCATION/TRAINING PROGRAM

## 2019-09-23 PROCEDURE — 73223 MRI JOINT UPR EXTR W/O&W/DYE: CPT | Mod: RT

## 2019-09-23 PROCEDURE — A9270 NON-COVERED ITEM OR SERVICE: HCPCS | Performed by: STUDENT IN AN ORGANIZED HEALTH CARE EDUCATION/TRAINING PROGRAM

## 2019-09-23 PROCEDURE — 700117 HCHG RX CONTRAST REV CODE 255: Performed by: STUDENT IN AN ORGANIZED HEALTH CARE EDUCATION/TRAINING PROGRAM

## 2019-09-23 PROCEDURE — 700102 HCHG RX REV CODE 250 W/ 637 OVERRIDE(OP): Performed by: STUDENT IN AN ORGANIZED HEALTH CARE EDUCATION/TRAINING PROGRAM

## 2019-09-23 PROCEDURE — A9576 INJ PROHANCE MULTIPACK: HCPCS | Performed by: STUDENT IN AN ORGANIZED HEALTH CARE EDUCATION/TRAINING PROGRAM

## 2019-09-23 PROCEDURE — 85025 COMPLETE CBC W/AUTO DIFF WBC: CPT

## 2019-09-23 PROCEDURE — 36415 COLL VENOUS BLD VENIPUNCTURE: CPT

## 2019-09-23 PROCEDURE — 306484 SLEEVE,VASO THIGH MED: Performed by: INTERNAL MEDICINE

## 2019-09-23 PROCEDURE — 80048 BASIC METABOLIC PNL TOTAL CA: CPT

## 2019-09-23 PROCEDURE — 770006 HCHG ROOM/CARE - MED/SURG/GYN SEMI*

## 2019-09-23 RX ORDER — MORPHINE SULFATE 4 MG/ML
4 INJECTION, SOLUTION INTRAMUSCULAR; INTRAVENOUS EVERY 4 HOURS PRN
Status: DISCONTINUED | OUTPATIENT
Start: 2019-09-23 | End: 2019-09-23

## 2019-09-23 RX ORDER — ONDANSETRON 4 MG/1
4 TABLET, ORALLY DISINTEGRATING ORAL EVERY 8 HOURS PRN
Status: DISCONTINUED | OUTPATIENT
Start: 2019-09-23 | End: 2019-09-28 | Stop reason: HOSPADM

## 2019-09-23 RX ORDER — IBUPROFEN 600 MG/1
600 TABLET ORAL 3 TIMES DAILY
Status: DISCONTINUED | OUTPATIENT
Start: 2019-09-23 | End: 2019-09-25

## 2019-09-23 RX ORDER — KETOROLAC TROMETHAMINE 30 MG/ML
30 INJECTION, SOLUTION INTRAMUSCULAR; INTRAVENOUS EVERY 6 HOURS PRN
Status: DISCONTINUED | OUTPATIENT
Start: 2019-09-23 | End: 2019-09-23

## 2019-09-23 RX ORDER — MORPHINE SULFATE 4 MG/ML
2 INJECTION, SOLUTION INTRAMUSCULAR; INTRAVENOUS
Status: DISCONTINUED | OUTPATIENT
Start: 2019-09-23 | End: 2019-09-24

## 2019-09-23 RX ORDER — ACETAMINOPHEN 500 MG
1000 TABLET ORAL EVERY 8 HOURS
Status: DISCONTINUED | OUTPATIENT
Start: 2019-09-23 | End: 2019-09-28 | Stop reason: HOSPADM

## 2019-09-23 RX ADMIN — IBUPROFEN 600 MG: 600 TABLET ORAL at 16:53

## 2019-09-23 RX ADMIN — MORPHINE SULFATE 2 MG: 4 INJECTION INTRAVENOUS at 02:00

## 2019-09-23 RX ADMIN — MORPHINE SULFATE 2 MG: 4 INJECTION INTRAVENOUS at 09:02

## 2019-09-23 RX ADMIN — MORPHINE SULFATE 4 MG: 4 INJECTION INTRAVENOUS at 05:56

## 2019-09-23 RX ADMIN — GADOTERIDOL 15 ML: 279.3 INJECTION, SOLUTION INTRAVENOUS at 12:30

## 2019-09-23 RX ADMIN — MORPHINE SULFATE 2 MG: 4 INJECTION INTRAVENOUS at 18:15

## 2019-09-23 RX ADMIN — IBUPROFEN 400 MG: 200 TABLET, FILM COATED ORAL at 08:17

## 2019-09-23 RX ADMIN — HEPARIN SODIUM 5000 UNITS: 5000 INJECTION INTRAVENOUS; SUBCUTANEOUS at 20:40

## 2019-09-23 RX ADMIN — ACETAMINOPHEN 1000 MG: 500 TABLET ORAL at 20:40

## 2019-09-23 RX ADMIN — MORPHINE SULFATE 2 MG: 4 INJECTION INTRAVENOUS at 11:02

## 2019-09-23 RX ADMIN — SENNOSIDES, DOCUSATE SODIUM 2 TABLET: 50; 8.6 TABLET, FILM COATED ORAL at 16:53

## 2019-09-23 RX ADMIN — MORPHINE SULFATE 2 MG: 4 INJECTION INTRAVENOUS at 20:40

## 2019-09-23 RX ADMIN — ONDANSETRON 4 MG: 4 TABLET, ORALLY DISINTEGRATING ORAL at 16:52

## 2019-09-23 RX ADMIN — MORPHINE SULFATE 4 MG: 4 INJECTION INTRAVENOUS at 01:55

## 2019-09-23 RX ADMIN — ACETAMINOPHEN 650 MG: 325 TABLET, FILM COATED ORAL at 00:28

## 2019-09-23 ASSESSMENT — ENCOUNTER SYMPTOMS
MEMORY LOSS: 0
ABDOMINAL PAIN: 0
DIZZINESS: 0
SINUS PAIN: 0
NECK PAIN: 0
NAUSEA: 0
SORE THROAT: 0
DOUBLE VISION: 0
LOSS OF CONSCIOUSNESS: 0
COUGH: 0
PHOTOPHOBIA: 0
MYALGIAS: 0
BLOOD IN STOOL: 0
SHORTNESS OF BREATH: 0
FOCAL WEAKNESS: 1
HEARTBURN: 0
PALPITATIONS: 0
TREMORS: 0
BLURRED VISION: 0
SENSORY CHANGE: 0
FEVER: 0
WHEEZING: 0
PND: 0
FALLS: 0
HEADACHES: 0
SPEECH CHANGE: 0
CHILLS: 1
VOMITING: 0
DEPRESSION: 0
TINGLING: 1
WEIGHT LOSS: 0

## 2019-09-23 NOTE — ED NOTES
Med rec completed per pt at bedside  Allergies reviewed  No ABX in last 14 days     Pt denies taking any prescription medications

## 2019-09-23 NOTE — CARE PLAN
Problem: Communication  Goal: The ability to communicate needs accurately and effectively will improve  Outcome: PROGRESSING AS EXPECTED     Problem: Safety  Goal: Will remain free from injury  Outcome: PROGRESSING AS EXPECTED  Goal: Will remain free from falls  Outcome: PROGRESSING AS EXPECTED     Problem: Venous Thromboembolism (VTW)/Deep Vein Thrombosis (DVT) Prevention:  Goal: Patient will participate in Venous Thrombosis (VTE)/Deep Vein Thrombosis (DVT)Prevention Measures  Outcome: PROGRESSING AS EXPECTED

## 2019-09-23 NOTE — ASSESSMENT & PLAN NOTE
No history of trauma .  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   Xray right shoulder shows minimal acromioclavicular and glenohumeral osteoarthrosis  - Ortho consulted; Dr. Ribeiro, s/p right shoulder joint arthrocentesis - minimal fluid on tap (few WBCs; negative for crystals and negative gm stain);   - MRI rt Shoulder - . No effusion in the joint space is noticed. Inflammation and edema of the rotator cuff muscles.      Plan:   - discharge home with out patient Physical therapy  2 x week .  - Pain medications : Tylenol and Ibuprofen  -  Flexeril for muscle spasms   -Short course of  Prednisone 40 mg daily for 5 days to decrease the inflammation and edema of the muscles

## 2019-09-23 NOTE — SENIOR ADMIT NOTE
Senior Admit Note    Patient: Claire Kwong.  MRN: [id number].                               Chief complaint: right shoulder pain    Problem list:   # right shoulder pain - seen by Orthopedic Surgery, Dr. Ribeiro, s/p right shoulder joint arthrocentesis.  Given minimal fluid from tap, Orthopedic Surgery recommending MRI shoulder.      Plan:   - Admit to Orthopedic Surgery as inpatient per Ortho recommendations.    - Follow-up joint arthrocentesis labs.  - Orthopedic Surgery to continue following; appreciated.   - MRI right shoulder with contrast.   - Diet until midnight.  NPO at midnight for possible surgical procedure tomorrow.    DVT prophylaxis: Heparin for tonight.  Hold heparin in the morning/replace with SCDs for possible surgical procedure.  Code status: Full.    For complete details, please refer to H&P by intern.     Lia Montague M.D.

## 2019-09-23 NOTE — PROGRESS NOTES
Bedside report received.  Assessment complete.  A&O x 4. Patient calls appropriately.  Patient ambulates with 1 assist. Bed alarm off.   Patient has 10/10 pain in Right shoulder. Minimal relief with IV morphine. PO pain meds not adequate  Denies N&V. Tolerating regular diet. NPO at midnight for possible surgery  Waiting for MRI to be completed  + void, + flatus,  BM prior to admit  Patient denies SOB. On Room Air  Vitals Stable.   Review plan with of care with patient. Call light and personal belongings with in reach. Hourly rounding in place. All needs met at this time.

## 2019-09-23 NOTE — CONSULTS
9/22/2019    Reason for consultation: Right shoulder pain    Consultation on Claire Kwong at the request of Dr. Stevenson for RIGHT shoulder pain.  The patient is a 38 y.o. female who presents with a 2 day history of right shoulder pain. The pain started Friday, 9/20. She states that her pain got progressively worse over the past two days. She denies fevers, recent illnesses. She reports chills which started yesterday afternoon.  Her symptoms are unrelieved to the use of anti-inflammatories.  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 She states that her work load had not significantly increased in recent weeks. Her pain is better with rest and immobilization, worse with movement.     Past Medical History:   Diagnosis Date   • Hx of ectopic pregnancy x 2 11/10/2011   • Migraine        Past Surgical History:   Procedure Laterality Date   • TONSILLECTOMY Bilateral 2019   • LAPAROSCOPIC ASSISTED SUBTOTAL HYSTERECTOMY  12/4/2018    Procedure: LAPAROSCOPIC ASSISTED TOTAL HYSTERECTOMY, SALPINGECTOMY,, VAGINAL VAULT SUSPENSION, CYSTOSCOPY;  Surgeon: Babatunde Glass M.D.;  Location: SURGERY [facility];  Service: Gynecology   • BRITTANY BY LAPAROSCOPY  4/30/2012    Performed by FREDI ROSARIO at SURGERY [facility]S   • ABDOMINAL EXPLORATION     • CHOLECYSTECTOMY     • GYN SURGERY     • TUBE & ECTOPIC PREG., REMOVAL  2001 & 1/2011    ectopic pregnancy removed first in 2001, 1/2011 Right tube and ovary removed due to another ectopic pregnancy       Medications  No current facility-administered medications on file prior to encounter.      Current Outpatient Medications on File Prior to Encounter   Medication Sig Dispense Refill   • vitamin D, Ergocalciferol, (DRISDOL) 41928 units Cap capsule Take 1 Cap by mouth every 7 days. 12 Cap 0   • docusate sodium (COLACE) 100 MG Cap Take 1 Cap by mouth 2 times a day. 60 Cap 2   • ondansetron (ZOFRAN ODT) 4 MG TABLET DISPERSIBLE  Take 1 Tab by mouth every four hours as needed for Nausea. 10 Tab 0   • lidocaine viscous 2% (XYLOCAINE) 2 % Solution Take 15 mL by mouth every 6 hours as needed for Throat/Mouth Pain. 180 mL 0       Allergies  Shellfish allergy    ROS  General Constitutional: Patient reports general good health lately;   Eyes: Patient denies vision abnormalities  Ears: Patient denies vertigo or balance issues   Cardiovascular: Patient  Denies chest pain  Respiratory: Patient denies shortness of breath   Integumentary:  no new skin lesions/rashes  Psychiatric: Patient denies h/o psychiatric conditions   Hematologic / Lymphatic: Patient denies bleeding or bruising  Allergic / Immunologic: Patient  Denies immunologic problems    Family History   Problem Relation Age of Onset   • Hypertension Mother    • Diabetes Maternal Grandmother        Social History     Socioeconomic History   • Marital status:      Spouse name: Not on file   • Number of children: Not on file   • Years of education: Not on file   • Highest education level: Not on file   Occupational History   • Not on file   Social Needs   • Financial resource strain: Not on file   • Food insecurity:     Worry: Not on file     Inability: Not on file   • Transportation needs:     Medical: Not on file     Non-medical: Not on file   Tobacco Use   • Smoking status: Passive Smoke Exposure - Never Smoker   • Smokeless tobacco: Never Used   Substance and Sexual Activity   • Alcohol use: No   • Drug use: No   • Sexual activity: Yes     Partners: Male   Lifestyle   • Physical activity:     Days per week: Not on file     Minutes per session: Not on file   • Stress: Not on file   Relationships   • Social connections:     Talks on phone: Not on file     Gets together: Not on file     Attends Cheondoism service: Not on file     Active member of club or organization: Not on file     Attends meetings of clubs or organizations: Not on file     Relationship status: Not on file   • Intimate  "partner violence:     Fear of current or ex partner: Not on file     Emotionally abused: Not on file     Physically abused: Not on file     Forced sexual activity: Not on file   Other Topics Concern   • Not on file   Social History Narrative    ** Merged History Encounter **         ** Merged History Encounter **            Physical Exam  Vitals  /74   Pulse 80   Temp 36.6 °C (97.9 °F) (Temporal)   Resp 20   Ht 1.575 m (5' 2\")   Wt 73 kg (160 lb 15 oz)   SpO2 98%   General: Well Developed, Well Nourished, no acute distress  Psychiatric: Alert and oriented x3, appropriate responses to questions, pleasant mood and affect.  HEENT: Normocephalic, atraumatic  Eyes: Anicteric, EOMI  Neck: Supple, nontender, no masses  Chest: Symmetric expansion of the chest wall, non-tender to palpation.  Heart: RRR, palpable peripheral pulses  Abdomen: Soft, NT, ND  Skin: Intact, no open wounds  Musculoskeletal: Right upper extremity examination reveals there to be significant muscular as well as bony tenderness palpation throughout the shoulder girdle.  There is no specific area that is more tender than others throughout the shoulder girdle.  She is unable to actively move her shoulder.  Gentle passive range of motion approximately 30 degrees of forward flexion, approximately 10 degrees of extension, approximately 20 degrees of abduction.  Each of these motions causes significant pain to the patient.  There is no warmth noted throughout the shoulder.  No surrounding erythema throughout the shoulder.  There is no pain with elbow range of motion which is full in flexion extension and pronation supination.  There is no pain in the wrist nor in the hand.  Muscle strength is diminished throughout the shoulder  Neuro: Sensation is intact light touch in the median, radial, ulnar nerve distributions.  Motor function of the wrist and elbow actively are intact.  Vascular: Palpable radial pulse, Capillary refill <2 " seconds    Radiographs: Independent review of the patient's radiographs reveals her to be no evidence of fracture or dislocation acutely.  DX-SHOULDER 2+ RIGHT   Final Result      Minimal acromioclavicular and glenohumeral osteoarthrosis. No acute osseous abnormality.          Laboratory Values  Recent Labs     09/22/19  1627   WBC 9.2   RBC 5.02   HEMOGLOBIN 15.3   HEMATOCRIT 46.5   MCV 92.6   MCH 30.5   MCHC 32.9*   RDW 41.6   PLATELETCT 309   MPV 10.5     Recent Labs     09/22/19  1627   SODIUM 141   POTASSIUM 3.9   CHLORIDE 107   CO2 25   GLUCOSE 103*   BUN 10             Impression:    #1  Migraines  #2  Acute Right shoulder pain    Plan:  The patient clinical picture is potentially concerning for septic arthritis of the shoulder.  Normal white blood cell count noted on laboratory evaluation as well as normal sedimentation rate.  However, the patient does have an elevated CRP which does not reduce the likelihood of infection in the absence of acute injury or fracture or dislocation.  Bedside procedure of attempted right shoulder arthrocentesis under sterile technique for the use of a spinal needle revealed there to be a dry tap.  The small amount of fluid that was collected from the joint was sent to the lab for evaluation.  Thus far there are white blood cells seen however there is no organisms seen.  There is not enough fluid to perform a cell count or analysis of crystals.  At this time the patient will be admitted for pain control to the medicine service  Orthopedic recommendations include MRI of the right shoulder and pending the results of this a possible CT-guided or other image guided arthrocentesis should there be evidence of an effusion on MRI.  Please keep the patient n.p.o. for the work-up reveal fluid collection and/or infection within the shoulder joint.  We will continue to follow the patient      Steffen Ribeiro MD

## 2019-09-23 NOTE — H&P
Internal Medicine Admitting History and Physical    Note Author: Galdino Shah M.D.       Name Claire Kwong       1980   Age/Sex 38 y.o. female   MRN [id number]   Code Status FULL CODE     After 5PM or if no immediate response to page, please call for cross-coverage  Attending/Team: Dr. Donis/Tiago See Patient List for primary contact information  Call [phone] to page    1st Call - Day Intern (R1):   Dr. Jennings 2nd Call - Day Sr. Resident (R2/R3):   Dr. Hogan       Chief Complaint:   Right shoulder pain    HPI:  Ms. Kwong is a 37 y/o female with PMHx of migraine and DVT. She was brought to the ED with the chief complaint of right shoulder pain. She started developing right shoulder pain 2 days ago. She noticed pain in the morning after waking up. It has been progressing gradually and yesterday increased up to 9/10 in intensity, continuous, increased by any movement of right shoulder and relieved by immobilization. Anti-inflammatory medications did not help in relieving pain. She has not been able to move her shoulder since yesterday and started feeling numb over her right arm and hand in the morning which made her to come to the hospital today. She reports couple of episodes of fever and chills but did not measure temperature. 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 She denies redness or swelling over right shoulder or any other joint pain, swelling, stiffness or rash; no previous history of joint infection; no urinary symptoms. She denies nausea/vomiting, abdominal pain, chest pain, shortness of breath, dizziness, bowel/baldder changes.     In ED, her VS are stable; CBC, electrolytes and Cr are WNL. CRP is mildly elevated 1.71. Xray right shoulder shows minimal acromioclavicular and glenohumeral osteoarthrosis. Ortho consulted; s/p relatively dry tap - showed some WBC, negative for crystals and gm stain; Recommended MRI rt shoulder.      Review of  Systems   Constitutional: Positive for chills. Negative for fever, malaise/fatigue and weight loss.   HENT: Negative for congestion, sinus pain and sore throat.    Eyes: Negative for blurred vision, double vision and photophobia.   Respiratory: Negative for cough, shortness of breath and wheezing.    Cardiovascular: Negative for chest pain, palpitations, leg swelling and PND.   Gastrointestinal: Negative for abdominal pain, blood in stool, heartburn, nausea and vomiting.   Genitourinary: Negative for dysuria and hematuria.   Musculoskeletal: Positive for joint pain (right shoulder pain). Negative for falls, myalgias and neck pain.   Skin: Negative for rash.   Neurological: Positive for tingling (right arm) and focal weakness (weakness in right hand). Negative for dizziness, tremors, sensory change, speech change, loss of consciousness and headaches.   Psychiatric/Behavioral: Negative for depression and memory loss.             Past Medical History (Chronic medical problem, known complications and current treatment)    Migraine  Right leg DVT  Tonsillitis  Ectopic pregnancy      Past Surgical History:  Past Surgical History:   Procedure Laterality Date   • TONSILLECTOMY Bilateral 2019   • LAPAROSCOPIC ASSISTED SUBTOTAL HYSTERECTOMY  12/4/2018    Procedure: LAPAROSCOPIC ASSISTED TOTAL HYSTERECTOMY, SALPINGECTOMY,, VAGINAL VAULT SUSPENSION, CYSTOSCOPY;  Surgeon: Babatunde Glass M.D.;  Location: SURGERY [facility];  Service: Gynecology   • BRITTANY BY LAPAROSCOPY  4/30/2012    Performed by FREDI ROSARIO at SURGERY [facility]   • ABDOMINAL EXPLORATION     • CHOLECYSTECTOMY     • GYN SURGERY     • TUBE & ECTOPIC PREG., REMOVAL  2001 & 1/2011    ectopic pregnancy removed first in 2001, 1/2011 Right tube and ovary removed due to another ectopic pregnancy       Current Outpatient Medications:  Home Medications     Reviewed by Fernandez Khalil (Pharmacy Tech) on 09/22/19 at 1918  Med List Status: Complete    Medication Last Dose Status   acetaminophen (TYLENOL) 325 MG Tab >2 days Active   ibuprofen (MOTRIN) 200 MG Tab 9/22/2019 Active   naproxen (ANAPROX) 220 MG tablet 9/21/2019 Active                Medication Allergy/Sensitivities:  Allergies   Allergen Reactions   • Shellfish Allergy Anaphylaxis, Hives and Shortness of Breath         Family History (mandatory)   Family History   Problem Relation Age of Onset   • Hypertension Mother    • Diabetes Maternal Grandmother        Social History (mandatory)   Social History     Socioeconomic History   • Marital status:      Spouse name: Not on file   • Number of children: Not on file   • Years of education: Not on file   • Highest education level: Not on file   Occupational History   • Not on file   Social Needs   • Financial resource strain: Not on file   • Food insecurity:     Worry: Not on file     Inability: Not on file   • Transportation needs:     Medical: Not on file     Non-medical: Not on file   Tobacco Use   • Smoking status: Passive Smoke Exposure - Never Smoker   • Smokeless tobacco: Never Used   Substance and Sexual Activity   • Alcohol use: No   • Drug use: No   • Sexual activity: Yes     Partners: Male   Lifestyle   • Physical activity:     Days per week: Not on file     Minutes per session: Not on file   • Stress: Not on file   Relationships   • Social connections:     Talks on phone: Not on file     Gets together: Not on file     Attends Muslim service: Not on file     Active member of club or organization: Not on file     Attends meetings of clubs or organizations: Not on file     Relationship status: Not on file   • Intimate partner violence:     Fear of current or ex partner: Not on file     Emotionally abused: Not on file     Physically abused: Not on file     Forced sexual activity: Not on file   Other Topics Concern   • Not on file   Social History Narrative    ** Merged History Encounter **         ** Merged History Encounter **       "    Living situation: Patient lives with her  and children.  PCP : LAURENT Landry    Physical Exam     Vitals:    09/22/19 1529 09/22/19 1817 09/22/19 2200 09/22/19 2230   BP:  117/74 132/88    Pulse:  80 79    Resp:  20 16    Temp:   36.6 °C (97.9 °F)    TempSrc:   Temporal    SpO2:  98% 98%    Weight: 73 kg (160 lb 15 oz)   73 kg (160 lb 15 oz)   Height: 1.575 m (5' 2\")        Body mass index is 29.44 kg/m².  O2 therapy: Pulse Oximetry: 98 %, O2 Delivery: None (Room Air)    Physical Exam   Constitutional: She is oriented to person, place, and time. She appears distressed.   Distressed due to pain in right shoulder   HENT:   Head: Normocephalic and atraumatic.   Eyes: Pupils are equal, round, and reactive to light.   Neck: Normal range of motion. Neck supple. No JVD present.   Cardiovascular: Normal rate, regular rhythm, normal heart sounds and intact distal pulses. Exam reveals no gallop and no friction rub.   No murmur heard.  Pulmonary/Chest: Effort normal and breath sounds normal. No respiratory distress. She has no wheezes. She exhibits no tenderness.   Abdominal: Soft. Bowel sounds are normal. She exhibits no distension. There is no tenderness.   Musculoskeletal: She exhibits tenderness. She exhibits no edema.   Tenderness over right shoulder; more over posterior part. Patient is having right shoulder brace and unable to  right shoulder due to pain. Restricted ROM.   Lymphadenopathy:     She has no cervical adenopathy.   Neurological: She is alert and oriented to person, place, and time. She displays weakness (right hand ).   Skin: Skin is warm. No rash noted. She is not diaphoretic. No erythema.   Psychiatric: Mood and affect normal.         Data Review       Old Records Request:   Completed  Current Records review/summary: Completed    Lab Data Review:  Recent Results (from the past 24 hour(s))   CBC WITH DIFFERENTIAL    Collection Time: 09/22/19  4:27 PM   Result Value Ref Range "    WBC 9.2 4.8 - 10.8 K/uL    RBC 5.02 4.20 - 5.40 M/uL    Hemoglobin 15.3 12.0 - 16.0 g/dL    Hematocrit 46.5 37.0 - 47.0 %    MCV 92.6 81.4 - 97.8 fL    MCH 30.5 27.0 - 33.0 pg    MCHC 32.9 (L) 33.6 - 35.0 g/dL    RDW 41.6 35.9 - 50.0 fL    Platelet Count 309 164 - 446 K/uL    MPV 10.5 9.0 - 12.9 fL    Neutrophils-Polys 67.70 44.00 - 72.00 %    Lymphocytes 21.00 (L) 22.00 - 41.00 %    Monocytes 7.00 0.00 - 13.40 %    Eosinophils 3.20 0.00 - 6.90 %    Basophils 0.80 0.00 - 1.80 %    Immature Granulocytes 0.30 0.00 - 0.90 %    Nucleated RBC 0.00 /100 WBC    Neutrophils (Absolute) 6.24 2.00 - 7.15 K/uL    Lymphs (Absolute) 1.93 1.00 - 4.80 K/uL    Monos (Absolute) 0.64 0.00 - 0.85 K/uL    Eos (Absolute) 0.29 0.00 - 0.51 K/uL    Baso (Absolute) 0.07 0.00 - 0.12 K/uL    Immature Granulocytes (abs) 0.03 0.00 - 0.11 K/uL    NRBC (Absolute) 0.00 K/uL   COMP METABOLIC PANEL    Collection Time: 09/22/19  4:27 PM   Result Value Ref Range    Sodium 141 135 - 145 mmol/L    Potassium 3.9 3.6 - 5.5 mmol/L    Chloride 107 96 - 112 mmol/L    Co2 25 20 - 33 mmol/L    Anion Gap 9.0 0.0 - 11.9    Glucose 103 (H) 65 - 99 mg/dL    Bun 10 8 - 22 mg/dL    Creatinine 0.71 0.50 - 1.40 mg/dL    Calcium 9.4 8.5 - 10.5 mg/dL    AST(SGOT) 16 12 - 45 U/L    ALT(SGPT) 17 2 - 50 U/L    Alkaline Phosphatase 99 30 - 99 U/L    Total Bilirubin 0.3 0.1 - 1.5 mg/dL    Albumin 4.3 3.2 - 4.9 g/dL    Total Protein 7.4 6.0 - 8.2 g/dL    Globulin 3.1 1.9 - 3.5 g/dL    A-G Ratio 1.4 g/dL   WESTERGREN SED RATE    Collection Time: 09/22/19  4:27 PM   Result Value Ref Range    Sed Rate [facility] 17 0 - 20 mm/hour   CRP QUANTITIVE (NON-CARDIAC)    Collection Time: 09/22/19  4:27 PM   Result Value Ref Range    Stat C-Reactive Protein 1.71 (H) 0.00 - 0.75 mg/dL   BETA-HCG QUALITATIVE SERUM    Collection Time: 09/22/19  4:27 PM   Result Value Ref Range    Beta-Hcg Qualitative Serum Negative Negative   ESTIMATED GFR    Collection Time: 09/22/19  4:27 PM   Result  Value Ref Range    GFR If African American >60 >60 mL/min/1.73 m 2    GFR If Non African American >60 >60 mL/min/1.73 m 2   FLUID CRYSTALS    Collection Time: 09/22/19  6:43 PM   Result Value Ref Range    Fluid Type Synovial     Crystals, Body Fluid None Seen None Seen   GRAM STAIN    Collection Time: 09/22/19  6:43 PM   Result Value Ref Range    Significant Indicator .     Source SYNO     Site Right Shoulder     Gram Stain Result Moderate WBCs.  No organisms seen.          Imaging/Procedures Review:    Independant Imaging Review: Completed     DX-SHOULDER 2+ RIGHT   Final Result      Minimal acromioclavicular and glenohumeral osteoarthrosis. No acute osseous abnormality.      MR-SHOULDER-WITH RIGHT    (Results Pending)             Assessment/Plan     * Right shoulder pain- (present on admission)  Assessment & Plan  - Patient c/o right shoulder pain X 2 days; numbness since today morning; episodes of fever and chills  - On exam, VS are stable; patient looks nontoxic but in distress due to pain; rt. shoulder tenderness without swelling or redness; restricted ROM on rt. shoulder and weakness in right handgrip  - possible septic joint in absence of trauma or fracture  - CBC, electrolytes and Cr are WNL. CRP is mildly elevated 1.71. Xray right shoulder shows minimal acromioclavicular and glenohumeral osteoarthrosis  - Xray right shoulder shows minimal acromioclavicular and glenohumeral osteoarthrosis  - Received IV morphine 6 mg in ED  - Ortho consulted; Dr. Ribeiro, s/p right shoulder joint arthrocentesis - minimal fluid on tap (shiwed WBCs; negative for crystals and negative gm stain);   - Ortho recommended MRI rt shoulder.  - admitted to medicine for pain management   - Morphine PRN for pain; will hold ABx for now  - On heparin; NPO from midnight for possible surgery tomorrow      Anticipated Hospital stay:  >2 midnights        Quality Measures  Quality-Core Measures   Reviewed items::  Labs reviewed, Medications  reviewed and Radiology images reviewed  Persaud catheter::  No Persaud  DVT prophylaxis pharmacological::  Heparin    PCP: LAURENT Landry

## 2019-09-23 NOTE — PROGRESS NOTES
2 RN Skin Check:    Band aid on right shoulder from Joint aspiration performed in ED today    Other skin WDL  No skin breakdown  All bony prominences checked. No skin issues

## 2019-09-23 NOTE — PROGRESS NOTES
Patient evaluated at bedside  Symptoms no different overnight.  Vitals stable  Painful throughout shoulder  Neuro intact hand/wrist    Plan:  MRI R shoulder today  Continued pain control measures

## 2019-09-23 NOTE — CARE PLAN
Problem: Communication  Goal: The ability to communicate needs accurately and effectively will improve  9/23/2019 0300 by Luis Srinivasan R.N.  Outcome: PROGRESSING AS EXPECTED  9/22/2019 2234 by Luis Srinivasan R.N.  Outcome: PROGRESSING AS EXPECTED     Problem: Safety  Goal: Will remain free from injury  9/23/2019 0300 by Luis Srinivasan R.N.  Outcome: PROGRESSING AS EXPECTED  9/22/2019 2234 by Luis Srinivasan R.N.  Outcome: PROGRESSING AS EXPECTED  Goal: Will remain free from falls  9/23/2019 0300 by Luis Srinivasan R.N.  Outcome: PROGRESSING AS EXPECTED  9/22/2019 2234 by Luis Srinivasan R.N.  Outcome: PROGRESSING AS EXPECTED     Problem: Infection  Goal: Will remain free from infection  9/23/2019 0300 by Luis Srinivasan R.N.  Outcome: PROGRESSING AS EXPECTED  9/22/2019 2234 by Luis Srinivasan R.N.  Outcome: PROGRESSING AS EXPECTED     Problem: Venous Thromboembolism (VTW)/Deep Vein Thrombosis (DVT) Prevention:  Goal: Patient will participate in Venous Thrombosis (VTE)/Deep Vein Thrombosis (DVT)Prevention Measures  9/23/2019 0300 by Luis Srinivasan R.N.  Outcome: PROGRESSING AS EXPECTED  9/22/2019 2234 by Luis Srinivasan R.N.  Outcome: PROGRESSING AS EXPECTED     Problem: Bowel/Gastric:  Goal: Normal bowel function is maintained or improved  9/23/2019 0300 by Luis Srinivasan R.N.  Outcome: PROGRESSING AS EXPECTED  9/22/2019 2234 by Luis Srinivasan R.N.  Outcome: PROGRESSING AS EXPECTED  Goal: Will not experience complications related to bowel motility  9/23/2019 0300 by Luis Srinivasan R.N.  Outcome: PROGRESSING AS EXPECTED  9/22/2019 2234 by Luis Srinivasan R.N.  Outcome: PROGRESSING AS EXPECTED     Problem: Knowledge Deficit  Goal: Knowledge of disease process/condition, treatment plan, diagnostic tests, and medications will improve  9/23/2019 0300 by Luis Srinivasan R.N.  Outcome: PROGRESSING AS EXPECTED  9/22/2019 2234 by Luis Srinivasan R.N.  Outcome:  PROGRESSING AS EXPECTED  Goal: Knowledge of the prescribed therapeutic regimen will improve  9/23/2019 0300 by KAYLA HolmanN.  Outcome: PROGRESSING AS EXPECTED  9/22/2019 2234 by Luis Srinivasan R.N.  Outcome: PROGRESSING AS EXPECTED     Problem: Discharge Barriers/Planning  Goal: Patient's continuum of care needs will be met  9/23/2019 0300 by Luis Srinivasan R.N.  Outcome: PROGRESSING AS EXPECTED  9/22/2019 2234 by Luis Srinivasan R.N.  Outcome: PROGRESSING AS EXPECTED     Problem: Pain Management  Goal: Pain level will decrease to patient's comfort goal  9/23/2019 0300 by Luis Srinivasan R.N.  Outcome: PROGRESSING AS EXPECTED  9/22/2019 2234 by Luis Srinivasan RLorraineN.  Outcome: PROGRESSING AS EXPECTED

## 2019-09-23 NOTE — CARE PLAN
Problem: Communication  Goal: The ability to communicate needs accurately and effectively will improve  Outcome: PROGRESSING AS EXPECTED     Problem: Safety  Goal: Will remain free from falls  Outcome: PROGRESSING AS EXPECTED     Problem: Bowel/Gastric:  Goal: Will not experience complications related to bowel motility  Outcome: PROGRESSING AS EXPECTED     Problem: Knowledge Deficit  Goal: Knowledge of the prescribed therapeutic regimen will improve  Outcome: PROGRESSING AS EXPECTED
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
## 2019-09-23 NOTE — ED PROVIDER NOTES
ED PROVIDER NOTE    Scribed for Andreas Hackett M.D. by Anastasiia Hinojosa. 9/22/2019, 6:09 PM.    This is an addendum to the note on Claire Kwong. For further details and full chart entry, see the previously signed ED Provider Note written by Dr. Stevenson (ERP).      5:38 PM - I discussed the patient's case with Dr. Stevenson (ERP) who will transfer care of the patient to me at this time. 38 y.o. female with a chief complaint of R shoulder pain pending ortho reccs      Would consider arthrocentesis  Medications   lidocaine (LIDODERM) 5 % 1 Patch (1 Patch Transdermal Patch Applied 9/22/19 1632)   senna-docusate (PERICOLACE or SENOKOT S) 8.6-50 MG per tablet 2 Tab (has no administration in time range)     And   polyethylene glycol/lytes (MIRALAX) PACKET 1 Packet (has no administration in time range)     And   magnesium hydroxide (MILK OF MAGNESIA) suspension 30 mL (has no administration in time range)     And   bisacodyl (DULCOLAX) suppository 10 mg (has no administration in time range)   acetaminophen (TYLENOL) tablet 650 mg (has no administration in time range)   enalaprilat (VASOTEC) injection 1.25 mg (has no administration in time range)   heparin injection 5,000 Units (has no administration in time range)   morphine (pf) 4 mg/ml injection 4 mg (has no administration in time range)   ketorolac (TORADOL) injection 30 mg (30 mg Intravenous Given 9/22/19 1627)   morphine (pf) 4 mg/ml injection 4 mg (4 mg Intravenous Given 9/22/19 1625)   ondansetron (ZOFRAN) syringe/vial injection 4 mg (4 mg Intravenous Given 9/22/19 1625)   morphine (pf) 10 mg/mL injection 6 mg (6 mg Intravenous Given 9/22/19 1821)   lidocaine (XYLOCAINE) 2 % injection 20 mL (20 mL Other Given 9/22/19 1825)     6:09 PM - Updated patient on plan of care to which she is understanding and agreeable.     Given ongoing pain and additional IV opiates required plan for admission to hospital.  Dr. Huang from orthopedics also requested MRI.      6:46 PM -  [facility].   Discussed case with [facility] and they agreed to order MRI and if pain persists in AM consider interventional radiology for arthrocentesis as bedside arthrocentesis performed by orthopedics was essentially a dry tap and only 1 cc of fluid obtained.      6:52 PM I discussed the patient's case and the above findings with Dr. Montague ([facility]) who agrees to admit      FINAL IMPRESSION   R shoulder pain     I, Anastasiia Hinojosa (Mathieu), am scribing for, and in the presence of, Andreas Hackett M.D..    Electronically signed by: Anastasiia Hinojosa (Mathieu), 9/22/2019    I, Andreas Hackett M.D. personally performed the services described in this documentation, as scribed by Anastasiia Hinojosa in my presence, and it is both accurate and complete.    The note accurately reflects work and decisions made by me.  Andreas Hackett  9/22/2019  9:11 PM

## 2019-09-23 NOTE — PROGRESS NOTES
3rd page placed to [facility].  placed phone call to MD's phone. Was put on hold. Spoke to Dr. oretga with [facility] who said he would relay message to resident intern to enter orders to increase frequency of IV morphine.  Awaiting orders

## 2019-09-24 PROBLEM — N89.8 BLOODY VAGINAL DISCHARGE: Status: ACTIVE | Noted: 2019-09-24

## 2019-09-24 PROCEDURE — 700102 HCHG RX REV CODE 250 W/ 637 OVERRIDE(OP): Performed by: INTERNAL MEDICINE

## 2019-09-24 PROCEDURE — 700111 HCHG RX REV CODE 636 W/ 250 OVERRIDE (IP): Performed by: STUDENT IN AN ORGANIZED HEALTH CARE EDUCATION/TRAINING PROGRAM

## 2019-09-24 PROCEDURE — A9270 NON-COVERED ITEM OR SERVICE: HCPCS | Performed by: STUDENT IN AN ORGANIZED HEALTH CARE EDUCATION/TRAINING PROGRAM

## 2019-09-24 PROCEDURE — 700101 HCHG RX REV CODE 250: Performed by: STUDENT IN AN ORGANIZED HEALTH CARE EDUCATION/TRAINING PROGRAM

## 2019-09-24 PROCEDURE — A9270 NON-COVERED ITEM OR SERVICE: HCPCS | Performed by: INTERNAL MEDICINE

## 2019-09-24 PROCEDURE — 700102 HCHG RX REV CODE 250 W/ 637 OVERRIDE(OP): Performed by: STUDENT IN AN ORGANIZED HEALTH CARE EDUCATION/TRAINING PROGRAM

## 2019-09-24 PROCEDURE — 770006 HCHG ROOM/CARE - MED/SURG/GYN SEMI*

## 2019-09-24 PROCEDURE — 99233 SBSQ HOSP IP/OBS HIGH 50: CPT | Mod: GC | Performed by: INTERNAL MEDICINE

## 2019-09-24 RX ORDER — LIDOCAINE 50 MG/G
1 PATCH TOPICAL EVERY 24 HOURS
Status: DISCONTINUED | OUTPATIENT
Start: 2019-09-24 | End: 2019-09-25

## 2019-09-24 RX ORDER — OXYCODONE HYDROCHLORIDE 5 MG/1
5 TABLET ORAL EVERY 6 HOURS PRN
Status: DISCONTINUED | OUTPATIENT
Start: 2019-09-24 | End: 2019-09-24

## 2019-09-24 RX ORDER — AMOXICILLIN 250 MG
2 CAPSULE ORAL 2 TIMES DAILY
Status: DISCONTINUED | OUTPATIENT
Start: 2019-09-24 | End: 2019-09-28 | Stop reason: HOSPADM

## 2019-09-24 RX ORDER — ANALGESIC BALM 1.74; 4.06 G/29G; G/29G
OINTMENT TOPICAL 3 TIMES DAILY PRN
Status: DISCONTINUED | OUTPATIENT
Start: 2019-09-24 | End: 2019-09-28 | Stop reason: HOSPADM

## 2019-09-24 RX ORDER — BISACODYL 10 MG
10 SUPPOSITORY, RECTAL RECTAL
Status: DISCONTINUED | OUTPATIENT
Start: 2019-09-24 | End: 2019-09-28 | Stop reason: HOSPADM

## 2019-09-24 RX ORDER — OXYCODONE HYDROCHLORIDE 5 MG/1
5 TABLET ORAL EVERY 4 HOURS PRN
Status: DISCONTINUED | OUTPATIENT
Start: 2019-09-24 | End: 2019-09-26

## 2019-09-24 RX ORDER — POLYETHYLENE GLYCOL 3350 17 G/17G
1 POWDER, FOR SOLUTION ORAL DAILY
Status: DISCONTINUED | OUTPATIENT
Start: 2019-09-24 | End: 2019-09-28 | Stop reason: HOSPADM

## 2019-09-24 RX ADMIN — ACETAMINOPHEN 1000 MG: 500 TABLET ORAL at 14:06

## 2019-09-24 RX ADMIN — HEPARIN SODIUM 5000 UNITS: 5000 INJECTION INTRAVENOUS; SUBCUTANEOUS at 14:06

## 2019-09-24 RX ADMIN — IBUPROFEN 600 MG: 600 TABLET ORAL at 05:25

## 2019-09-24 RX ADMIN — MORPHINE SULFATE 2 MG: 4 INJECTION INTRAVENOUS at 04:00

## 2019-09-24 RX ADMIN — POLYETHYLENE GLYCOL 3350 1 PACKET: 17 POWDER, FOR SOLUTION ORAL at 09:39

## 2019-09-24 RX ADMIN — SENNOSIDES, DOCUSATE SODIUM 2 TABLET: 50; 8.6 TABLET, FILM COATED ORAL at 20:46

## 2019-09-24 RX ADMIN — MORPHINE SULFATE 2 MG: 4 INJECTION INTRAVENOUS at 09:39

## 2019-09-24 RX ADMIN — MENTHOL AND METHYL SALICYLATE: 7.6; 29 OINTMENT TOPICAL at 17:35

## 2019-09-24 RX ADMIN — ACETAMINOPHEN 1000 MG: 500 TABLET ORAL at 05:25

## 2019-09-24 RX ADMIN — ONDANSETRON 4 MG: 4 TABLET, ORALLY DISINTEGRATING ORAL at 12:17

## 2019-09-24 RX ADMIN — OXYCODONE HYDROCHLORIDE 5 MG: 5 TABLET ORAL at 12:17

## 2019-09-24 RX ADMIN — LIDOCAINE 1 PATCH: 50 PATCH TOPICAL at 12:17

## 2019-09-24 RX ADMIN — ACETAMINOPHEN 1000 MG: 500 TABLET ORAL at 20:46

## 2019-09-24 RX ADMIN — OXYCODONE HYDROCHLORIDE 5 MG: 5 TABLET ORAL at 21:41

## 2019-09-24 RX ADMIN — IBUPROFEN 600 MG: 600 TABLET ORAL at 12:17

## 2019-09-24 RX ADMIN — OXYCODONE HYDROCHLORIDE 5 MG: 5 TABLET ORAL at 17:35

## 2019-09-24 RX ADMIN — IBUPROFEN 600 MG: 600 TABLET ORAL at 17:35

## 2019-09-24 RX ADMIN — HEPARIN SODIUM 5000 UNITS: 5000 INJECTION INTRAVENOUS; SUBCUTANEOUS at 05:25

## 2019-09-24 RX ADMIN — SENNOSIDES, DOCUSATE SODIUM 2 TABLET: 50; 8.6 TABLET, FILM COATED ORAL at 05:25

## 2019-09-24 ASSESSMENT — ENCOUNTER SYMPTOMS
FEVER: 0
ABDOMINAL PAIN: 0
CHILLS: 1
VOMITING: 0
DIARRHEA: 0
NAUSEA: 0
CONSTIPATION: 0
PALPITATIONS: 0

## 2019-09-24 NOTE — CARE PLAN
Problem: Communication  Goal: The ability to communicate needs accurately and effectively will improve  9/24/2019 0639 by Rasheed Yadav R.N.  Outcome: PROGRESSING AS EXPECTED  Note:   Discussed POC with patient Updated white board Calls appropriately  Call light within reach   9/23/2019 2217 by Rasheed Yadav R.N.  Outcome: PROGRESSING AS EXPECTED  Note:   Discussed POC with patient Updated white board Calls appropriately  Call light within reach      Problem: Pain Management  Goal: Pain level will decrease to patient's comfort goal  9/24/2019 0639 by Rasheed Yadav R.N.  Outcome: PROGRESSING AS EXPECTED  Note:   R arm pain, medicated per MAR with some relief.  Non pharm comfort measure includes: sling, ice pack, elevation, rest and sleep.  9/23/2019 2217 by Rasheed Yadav RJOJO  Outcome: PROGRESSING AS EXPECTED  Note:   Reports pain to R arm/sh
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
## 2019-09-24 NOTE — PROGRESS NOTES
MRI reviewed  Subcutaneous edema  No effusion  Afebrile, vitals stable    Exam: continued right shoulder pain globally throughout shoulder and with motion    R shoulder arthrocentesis: moderate wbcs, no organisms, no growth  Plan:  No acute need for orthopaedic intervention at this time  Will follow up cultures  Recommend aggressive antiinflammatory treatment, PT/OT

## 2019-09-24 NOTE — DISCHARGE PLANNING
Care Transition Team Assessment  Met with patient, lives with spouseLes [phone] Has not had HH in the past or IV abx at home.         Information Source  Orientation : Oriented x 4  Information Given By: Patient  Informant's Name: Claire  Who is responsible for making decisions for patient? : Patient    Readmission Evaluation  Is this a readmission?: No    Elopement Risk  Legal Hold: No  Ambulatory or Self Mobile in Wheelchair: Yes  Disoriented: No  Psychiatric Symptoms: None  History of Wandering: No  Elopement this Admit: No  Vocalizing Wanting to Leave: No  Displays Behaviors, Body Language Wanting to Leave: No-Not at Risk for Elopement  Elopement Risk: Not at Risk for Elopement    Interdisciplinary Discharge Planning  Patient or legal guardian wants to designate a caregiver (see row info): No    Discharge Preparedness  What is your plan after discharge?: Uncertain - pending medical team collaboration  What are your discharge supports?: Spouse  Prior Functional Level: Independent with Activities of Daily Living    Functional Assesment  Prior Functional Level: Independent with Activities of Daily Living    Finances  Financial Barriers to Discharge: No  Prescription Coverage: No    Vision / Hearing Impairment  Vision Impairment : No  Hearing Impairment : No              Domestic Abuse  Have you ever been the victim of abuse or violence?: No  Physical Abuse or Sexual Abuse: No  Verbal Abuse or Emotional Abuse: No  Possible Abuse Reported to:: Not Applicable    Psychological Assessment  History of Substance Abuse: None  History of Psychiatric Problems: No  Non-compliant with Treatment: No  Newly Diagnosed Illness: No    Discharge Risks or Barriers  Discharge risks or barriers?: Uninsured / underinsured    Anticipated Discharge Information  Anticipated discharge disposition: Home  Discharge Address: [address] [place] [address]  Discharge Contact Phone Number: [phone]

## 2019-09-24 NOTE — CARE PLAN
Problem: Communication  Goal: The ability to communicate needs accurately and effectively will improve  Outcome: PROGRESSING AS EXPECTED     Problem: Safety  Goal: Will remain free from injury  Outcome: PROGRESSING AS EXPECTED  Goal: Will remain free from falls  Outcome: PROGRESSING AS EXPECTED  Note:   Bed locked in lowest position with two side rails up. Frequent rounding done     Problem: Discharge Barriers/Planning  Goal: Patient's continuum of care needs will be met  Outcome: PROGRESSING AS EXPECTED     Problem: Pain Management  Goal: Pain level will decrease to patient's comfort goal  Outcome: PROGRESSING AS EXPECTED  Note:   Prn pain medication ordered

## 2019-09-24 NOTE — ASSESSMENT & PLAN NOTE
EMS at bedside.   Minimal vaginal bleed x 1 episode   Resolved    Plan:  Follow-up with outpatient gynecologist, patient to call after discharge to evaluate

## 2019-09-24 NOTE — NON-PROVIDER
Internal Medicine Interval Note  Note Author: Sasha Finnkarenshanicedorie, Student     Name Claire Kwong       1980   Age/Sex 38 y.o. female   MRN [id number]   Code Status Full Code     After 5PM or if no immediate response to page, please call for cross-coverage  Attending/Team: Jewels/Tiago See Patient List for primary contact information  Call [phone] to page    1st Call - Day Intern (R1):   Dr. Jennings 2nd Call - Day Sr. Resident (R2/R3):   Dr. Hogan       Reason for interval visit  (Principal Problem)   Right shoulder pain    Interval Problem Daily Status Update  (24 hours)   Ms. Kwong is a 37 yo female w/ PMH of migraine and DVT who presented to the ED with acute R shoulder pain without a trauma history. Patient had had chills overnight starting around 3am, but reports no fever. Her pain today has decreased slightly, she rates it an 8/10 when previously it was 10/10. She still reports extreme pain with movement. 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 No tears or effusions noted. Complete shoulder exam was still unable to be performed due to extreme pain.     Review of Systems   Constitutional: Positive for chills. Negative for fever.   Cardiovascular: Negative for chest pain and palpitations.   Gastrointestinal: Negative for abdominal pain, constipation, diarrhea, nausea and vomiting.     Quality Measures  Quality-Core Measures   Reviewed items::  Medications reviewed, Labs reviewed and Radiology images reviewed      Physical Exam       Vitals:    19 1600 19 2000 19 0500 19 0900   BP: 134/79 121/71 104/70 112/63   Pulse: 89 95 77 73   Resp: 15 17 17 16   Temp: 36.7 °C (98.1 °F) 37.1 °C (98.8 °F) 36.6 °C (97.9 °F) 36.8 °C (98.2 °F)   TempSrc: Temporal Temporal  Temporal Temporal   SpO2: 96% 94% 93%    Weight:       Height:         Body mass index is 29.44 kg/m².    Oxygen Therapy:  Pulse Oximetry: 93 %, O2 (LPM): 0, O2 Delivery: None (Room Air)    Physical Exam   Constitutional: She appears distressed.   Cardiovascular: Normal rate, regular rhythm and intact distal pulses. Exam reveals gallop. Exam reveals no friction rub.   No murmur heard.  Musculoskeletal:   No edema or erythema noted on shoulders bilaterally, pain to palpation worse on the dorsal part of the shoulder, patient unable to move arm due to extreme pain     Lab Data Review:       9/24/2019  10:45 AM    Recent Labs     09/22/19 1627 09/23/19  0438   SODIUM 141 140   POTASSIUM 3.9 4.3   CHLORIDE 107 110   CO2 25 24   BUN 10 10   CREATININE 0.71 0.63   CALCIUM 9.4 8.8       Recent Labs     09/22/19 1627 09/23/19  0438   ALTSGPT 17  --    ASTSGOT 16  --    ALKPHOSPHAT 99  --    TBILIRUBIN 0.3  --    GLUCOSE 103* 103*       Recent Labs     09/22/19 1627 09/23/19  0438   RBC 5.02 4.68   HEMOGLOBIN 15.3 14.1   HEMATOCRIT 46.5 43.4   PLATELETCT 309 260       Recent Labs     09/22/19 1627 09/23/19  0438   WBC 9.2 7.2   NEUTSPOLYS 67.70 55.90   LYMPHOCYTES 21.00* 29.50   MONOCYTES 7.00 9.70   EOSINOPHILS 3.20 3.90   BASOPHILS 0.80 0.70   ASTSGOT 16  --    ALTSGPT 17  --    ALKPHOSPHAT 99  --    TBILIRUBIN 0.3  --        Assessment/Plan     * Right shoulder pain- (present on admission)  Assessment & Plan   Patient has had extreme shoulder pain for the last 3 days with minimal relief. Xray, MRI, and arthrocentesis has r/o rotator cuff tear, septic arthritis, or tendinopathy. 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  -Ortho recommends recommend aggressive NSAID treatment and PT/OT  -No orthopedic intervention needed at this time  - Will f/u on blood cultures  -Pain management is the goal- oxycodone for  right now and ibuprofen 600mg TID, start lidocaine patch  - Consider starting PPI for GI prophylaxis due to high dose NSAID use

## 2019-09-25 LAB
BACTERIA FLD AEROBE CULT: NORMAL
CK SERPL-CCNC: 49 U/L (ref 0–154)
GRAM STN SPEC: NORMAL
SIGNIFICANT IND 70042: NORMAL
SITE SITE: NORMAL
SOURCE SOURCE: NORMAL

## 2019-09-25 PROCEDURE — 97535 SELF CARE MNGMENT TRAINING: CPT

## 2019-09-25 PROCEDURE — 97161 PT EVAL LOW COMPLEX 20 MIN: CPT

## 2019-09-25 PROCEDURE — A9270 NON-COVERED ITEM OR SERVICE: HCPCS | Performed by: STUDENT IN AN ORGANIZED HEALTH CARE EDUCATION/TRAINING PROGRAM

## 2019-09-25 PROCEDURE — 700111 HCHG RX REV CODE 636 W/ 250 OVERRIDE (IP): Performed by: STUDENT IN AN ORGANIZED HEALTH CARE EDUCATION/TRAINING PROGRAM

## 2019-09-25 PROCEDURE — 770006 HCHG ROOM/CARE - MED/SURG/GYN SEMI*

## 2019-09-25 PROCEDURE — 99232 SBSQ HOSP IP/OBS MODERATE 35: CPT | Mod: GC | Performed by: INTERNAL MEDICINE

## 2019-09-25 PROCEDURE — 36415 COLL VENOUS BLD VENIPUNCTURE: CPT

## 2019-09-25 PROCEDURE — 700102 HCHG RX REV CODE 250 W/ 637 OVERRIDE(OP): Performed by: STUDENT IN AN ORGANIZED HEALTH CARE EDUCATION/TRAINING PROGRAM

## 2019-09-25 PROCEDURE — 82550 ASSAY OF CK (CPK): CPT

## 2019-09-25 PROCEDURE — A9270 NON-COVERED ITEM OR SERVICE: HCPCS | Performed by: INTERNAL MEDICINE

## 2019-09-25 PROCEDURE — 97165 OT EVAL LOW COMPLEX 30 MIN: CPT

## 2019-09-25 PROCEDURE — 700102 HCHG RX REV CODE 250 W/ 637 OVERRIDE(OP): Performed by: INTERNAL MEDICINE

## 2019-09-25 RX ORDER — CYCLOBENZAPRINE HCL 10 MG
10 TABLET ORAL 2 TIMES DAILY PRN
Status: DISPENSED | OUTPATIENT
Start: 2019-09-25 | End: 2019-09-27

## 2019-09-25 RX ORDER — PREDNISONE 20 MG/1
40 TABLET ORAL DAILY
Status: DISCONTINUED | OUTPATIENT
Start: 2019-09-25 | End: 2019-09-28 | Stop reason: HOSPADM

## 2019-09-25 RX ORDER — METHYLPREDNISOLONE 16 MG/1
32 TABLET ORAL DAILY
Status: DISCONTINUED | OUTPATIENT
Start: 2019-09-25 | End: 2019-09-25

## 2019-09-25 RX ORDER — IBUPROFEN 800 MG/1
800 TABLET ORAL 3 TIMES DAILY
Status: DISCONTINUED | OUTPATIENT
Start: 2019-09-25 | End: 2019-09-28 | Stop reason: HOSPADM

## 2019-09-25 RX ORDER — OMEPRAZOLE 20 MG/1
20 CAPSULE, DELAYED RELEASE ORAL DAILY
Status: DISCONTINUED | OUTPATIENT
Start: 2019-09-25 | End: 2019-09-28 | Stop reason: HOSPADM

## 2019-09-25 RX ADMIN — MAGNESIUM HYDROXIDE 30 ML: 400 SUSPENSION ORAL at 17:18

## 2019-09-25 RX ADMIN — OXYCODONE HYDROCHLORIDE 5 MG: 5 TABLET ORAL at 13:55

## 2019-09-25 RX ADMIN — OXYCODONE HYDROCHLORIDE 5 MG: 5 TABLET ORAL at 08:37

## 2019-09-25 RX ADMIN — SENNOSIDES, DOCUSATE SODIUM 2 TABLET: 50; 8.6 TABLET, FILM COATED ORAL at 17:18

## 2019-09-25 RX ADMIN — OXYCODONE HYDROCHLORIDE 5 MG: 5 TABLET ORAL at 23:01

## 2019-09-25 RX ADMIN — OXYCODONE HYDROCHLORIDE 5 MG: 5 TABLET ORAL at 04:22

## 2019-09-25 RX ADMIN — IBUPROFEN 600 MG: 600 TABLET ORAL at 04:22

## 2019-09-25 RX ADMIN — IBUPROFEN 800 MG: 800 TABLET, FILM COATED ORAL at 11:33

## 2019-09-25 RX ADMIN — ENOXAPARIN SODIUM 40 MG: 100 INJECTION SUBCUTANEOUS at 04:22

## 2019-09-25 RX ADMIN — IBUPROFEN 800 MG: 800 TABLET, FILM COATED ORAL at 17:18

## 2019-09-25 RX ADMIN — ACETAMINOPHEN 1000 MG: 500 TABLET ORAL at 13:55

## 2019-09-25 RX ADMIN — ACETAMINOPHEN 1000 MG: 500 TABLET ORAL at 20:34

## 2019-09-25 RX ADMIN — CYCLOBENZAPRINE 10 MG: 10 TABLET, FILM COATED ORAL at 11:33

## 2019-09-25 RX ADMIN — ACETAMINOPHEN 1000 MG: 500 TABLET ORAL at 04:22

## 2019-09-25 RX ADMIN — PREDNISONE 40 MG: 20 TABLET ORAL at 11:33

## 2019-09-25 RX ADMIN — MENTHOL AND METHYL SALICYLATE: 7.6; 29 OINTMENT TOPICAL at 20:34

## 2019-09-25 RX ADMIN — OMEPRAZOLE 20 MG: 20 CAPSULE, DELAYED RELEASE ORAL at 08:37

## 2019-09-25 RX ADMIN — POLYETHYLENE GLYCOL 3350 1 PACKET: 17 POWDER, FOR SOLUTION ORAL at 08:37

## 2019-09-25 RX ADMIN — SENNOSIDES, DOCUSATE SODIUM 2 TABLET: 50; 8.6 TABLET, FILM COATED ORAL at 08:36

## 2019-09-25 RX ADMIN — OXYCODONE HYDROCHLORIDE 5 MG: 5 TABLET ORAL at 18:33

## 2019-09-25 ASSESSMENT — COGNITIVE AND FUNCTIONAL STATUS - GENERAL
HELP NEEDED FOR BATHING: A LITTLE
TURNING FROM BACK TO SIDE WHILE IN FLAT BAD: A LITTLE
DRESSING REGULAR UPPER BODY CLOTHING: A LITTLE
MOBILITY SCORE: 22
CLIMB 3 TO 5 STEPS WITH RAILING: A LITTLE
DRESSING REGULAR LOWER BODY CLOTHING: A LITTLE
SUGGESTED CMS G CODE MODIFIER MOBILITY: CJ
TOILETING: A LITTLE
DAILY ACTIVITIY SCORE: 20
SUGGESTED CMS G CODE MODIFIER DAILY ACTIVITY: CJ

## 2019-09-25 ASSESSMENT — GAIT ASSESSMENTS
DISTANCE (FEET): 500
DEVIATION: BRADYKINETIC
GAIT LEVEL OF ASSIST: SUPERVISED

## 2019-09-25 ASSESSMENT — ACTIVITIES OF DAILY LIVING (ADL): TOILETING: INDEPENDENT
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
## 2019-09-25 NOTE — CARE PLAN
Pt uses call light as necessary to contact medical staff. Bed locked in lowest position with top two siderails up. Call light within reach. Treaded socks on. Educated patient to call for help before getting up

## 2019-09-25 NOTE — THERAPY
"Physical Therapy Evaluation completed.   Bed Mobility:  Supine to Sit: Modified Independent  Transfers: Sit to Stand: Modified Independent  Gait: Level Of Assist: Supervised with No Equipment Needed       Plan of Care: Patient with no further skilled PT needs in the acute care setting and demonstrates safety with mobility in this environment at this time.   Discharge Recommendations: Equipment: No Equipment Needed. Recommend outpatient physical or occupational therapy services to address higher level deficits.    See \"Rehab Therapy-Acute\" Patient Summary Report for complete documentation.     Pt is a 39yo female presenting to acute with c/o R shoulder pain, unknown SARMAD. Pt demonstrated safe mobility in this environment including ambulating without AD, and negotiating multiple flights of stairs. Pt did not express concerns with return home. Would recommend outpatient PT or OT to assist in shoulder ROM, strengthening, and functional independence. Patient will not be actively followed for physical therapy services at this time, however may be seen if requested by physician for 1 more visit within 30 days to address any discharge or equipment needs.  "

## 2019-09-25 NOTE — CARE PLAN
Problem: Discharge Barriers/Planning  Goal: Patient's continuum of care needs will be met  Outcome: PROGRESSING AS EXPECTED  Note:   Waiting for pain to be better controlled before discharging     Problem: Pain Management  Goal: Pain level will decrease to patient's comfort goal  Outcome: PROGRESSING AS EXPECTED  Note:   Patient has scheduled and prn pain medications ordered

## 2019-09-26 ENCOUNTER — APPOINTMENT (OUTPATIENT)
Dept: RADIOLOGY | Facility: MEDICAL CENTER | Age: 39
DRG: 558 | End: 2019-09-26
Attending: STUDENT IN AN ORGANIZED HEALTH CARE EDUCATION/TRAINING PROGRAM

## 2019-09-26 ENCOUNTER — PATIENT OUTREACH (OUTPATIENT)
Dept: HEALTH INFORMATION MANAGEMENT | Facility: OTHER | Age: 39
End: 2019-09-26

## 2019-09-26 PROBLEM — M75.81 ROTATOR CUFF TENDONITIS, RIGHT: Status: ACTIVE | Noted: 2019-09-22

## 2019-09-26 PROCEDURE — 700102 HCHG RX REV CODE 250 W/ 637 OVERRIDE(OP): Performed by: STUDENT IN AN ORGANIZED HEALTH CARE EDUCATION/TRAINING PROGRAM

## 2019-09-26 PROCEDURE — A9270 NON-COVERED ITEM OR SERVICE: HCPCS | Performed by: STUDENT IN AN ORGANIZED HEALTH CARE EDUCATION/TRAINING PROGRAM

## 2019-09-26 PROCEDURE — 770006 HCHG ROOM/CARE - MED/SURG/GYN SEMI*

## 2019-09-26 PROCEDURE — 99233 SBSQ HOSP IP/OBS HIGH 50: CPT | Mod: GC | Performed by: INTERNAL MEDICINE

## 2019-09-26 PROCEDURE — 74018 RADEX ABDOMEN 1 VIEW: CPT

## 2019-09-26 PROCEDURE — A9270 NON-COVERED ITEM OR SERVICE: HCPCS | Performed by: INTERNAL MEDICINE

## 2019-09-26 PROCEDURE — 700111 HCHG RX REV CODE 636 W/ 250 OVERRIDE (IP): Performed by: STUDENT IN AN ORGANIZED HEALTH CARE EDUCATION/TRAINING PROGRAM

## 2019-09-26 PROCEDURE — 700102 HCHG RX REV CODE 250 W/ 637 OVERRIDE(OP): Performed by: INTERNAL MEDICINE

## 2019-09-26 RX ORDER — OMEPRAZOLE 20 MG/1
20 CAPSULE, DELAYED RELEASE ORAL DAILY
Qty: 7 CAP | Refills: 0 | Status: SHIPPED | OUTPATIENT
Start: 2019-09-27 | End: 2019-10-04

## 2019-09-26 RX ORDER — ACETAMINOPHEN 500 MG
1000 TABLET ORAL EVERY 8 HOURS
Qty: 42 TAB | Refills: 0 | Status: SHIPPED | OUTPATIENT
Start: 2019-09-26 | End: 2019-10-03

## 2019-09-26 RX ORDER — IBUPROFEN 800 MG/1
800 TABLET ORAL 3 TIMES DAILY
Qty: 21 TAB | Refills: 0 | Status: SHIPPED | OUTPATIENT
Start: 2019-09-27 | End: 2019-10-04

## 2019-09-26 RX ORDER — PREDNISONE 20 MG/1
40 TABLET ORAL DAILY
Qty: 6 TAB | Refills: 0 | Status: SHIPPED | OUTPATIENT
Start: 2019-09-27 | End: 2019-09-30

## 2019-09-26 RX ORDER — POLYETHYLENE GLYCOL 3350 17 G/17G
17 POWDER, FOR SOLUTION ORAL DAILY
Qty: 7 EACH | Refills: 0 | Status: SHIPPED | OUTPATIENT
Start: 2019-09-26 | End: 2019-10-03

## 2019-09-26 RX ORDER — CYCLOBENZAPRINE HCL 10 MG
10 TABLET ORAL 2 TIMES DAILY PRN
Qty: 14 TAB | Refills: 0 | Status: SHIPPED | OUTPATIENT
Start: 2019-09-26 | End: 2019-10-03

## 2019-09-26 RX ADMIN — CYCLOBENZAPRINE 10 MG: 10 TABLET, FILM COATED ORAL at 19:37

## 2019-09-26 RX ADMIN — METHYLNALTREXONE BROMIDE 12 MG: 12 INJECTION, SOLUTION SUBCUTANEOUS at 17:57

## 2019-09-26 RX ADMIN — ENOXAPARIN SODIUM 40 MG: 100 INJECTION SUBCUTANEOUS at 06:18

## 2019-09-26 RX ADMIN — POLYETHYLENE GLYCOL 3350 1 PACKET: 17 POWDER, FOR SOLUTION ORAL at 06:20

## 2019-09-26 RX ADMIN — BISACODYL 10 MG: 10 SUPPOSITORY RECTAL at 08:03

## 2019-09-26 RX ADMIN — PREDNISONE 40 MG: 20 TABLET ORAL at 06:16

## 2019-09-26 RX ADMIN — IBUPROFEN 800 MG: 800 TABLET, FILM COATED ORAL at 12:26

## 2019-09-26 RX ADMIN — SENNOSIDES, DOCUSATE SODIUM 2 TABLET: 50; 8.6 TABLET, FILM COATED ORAL at 06:16

## 2019-09-26 RX ADMIN — MENTHOL AND METHYL SALICYLATE: 7.6; 29 OINTMENT TOPICAL at 19:37

## 2019-09-26 RX ADMIN — OXYCODONE HYDROCHLORIDE 5 MG: 5 TABLET ORAL at 10:18

## 2019-09-26 RX ADMIN — OXYCODONE HYDROCHLORIDE 5 MG: 5 TABLET ORAL at 06:16

## 2019-09-26 RX ADMIN — ACETAMINOPHEN 1000 MG: 500 TABLET ORAL at 06:16

## 2019-09-26 RX ADMIN — MAGNESIUM CITRATE 296 ML: 1.75 LIQUID ORAL at 12:26

## 2019-09-26 RX ADMIN — CYCLOBENZAPRINE 10 MG: 10 TABLET, FILM COATED ORAL at 00:52

## 2019-09-26 RX ADMIN — IBUPROFEN 800 MG: 800 TABLET, FILM COATED ORAL at 06:16

## 2019-09-26 RX ADMIN — OMEPRAZOLE 20 MG: 20 CAPSULE, DELAYED RELEASE ORAL at 06:16

## 2019-09-26 NOTE — FACE TO FACE
Face to Face Supporting Documentation - Home Health    The encounter with this patient was in whole or in part the primary reason for home health admission.    Date of encounter:   Patient:                    MRN:                       YOB: 2021  Claire Kwong  [id number]  1980     Home health to see patient for:  Physical Therapy evaluation and treatment and Occupational therapy evaluation and treatment    Skilled need for:  New Onset Medical Diagnosis Right shoulder pain and tenderness - Rotator cuff tendinitis     Skilled nursing interventions to include:  Comment: physical therapy and occupational therapy    Homebound status evidenced by:  Can leave home - so can get either home physical therapy or  outpatient PT/OT    Community Physician to provide follow up care: LAURENT Landry     Optional Interventions? No      I certify the face to face encounter for this home health care referral meets the CMS requirements and the encounter/clinical assessment with the patient was, in whole, or in part, for the medical condition(s) listed above, which is the primary reason for home health care. Based on my clinical findings: the service(s) are medically necessary, support the need for home health care, and the homebound criteria are met.  I certify that this patient has had a face to face encounter by myself.  Vikki Jennings M.D. - NPI: [id number]
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
## 2019-09-26 NOTE — PROGRESS NOTES
Assumed pt care at 0715.  Received report from night RN.  Assessment completed.  Pt AAOx4.  Pt complains of pain.  Repositioned for comfort.  Suppository given per MD request.  No other s/s of discomfort or distress. Pt ambulates up self.  Bed in lowest position and locked.  Pt calls appropriately.  Treaded socks in place, call light within reach and staff numbers provided.  Pt needs met at this time.

## 2019-09-26 NOTE — ASSESSMENT & PLAN NOTE
Patient has a history of constipation  At an average she has a BM in every 3- 4 days  She had no BM since last 8 days .  Has colicky abdomen pain along with nausea  Not able to tolerate oral feeds or medications due to discomfort .  Patient was on oxycodone so methyl naltrexone dose x 2 given - no improvement .  Repeat abdominal x ray - shows non obstructive picture with gas .  Magnesium citrate and 3 different enemas done but  didn't work    Plan:  CT abdomen-pelvis w/o contrast  - Negative for SBO or inflammation . Shows increased stool throughout the colon consistent with mild constipation. No small bowel dilatation.  Iv Metoclopramide 10 mg Q6 hrs   - If no BM will consider to consult  GI
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
## 2019-09-26 NOTE — CARE PLAN
Problem: Knowledge Deficit  Goal: Knowledge of disease process/condition, treatment plan, diagnostic tests, and medications will improve  Note:   Updated on POC for today.     Problem: Pain Management  Goal: Pain level will decrease to patient's comfort goal  Note:   Repositioned for comfort.

## 2019-09-26 NOTE — CARE PLAN
Problem: Venous Thromboembolism (VTW)/Deep Vein Thrombosis (DVT) Prevention:  Goal: Patient will participate in Venous Thrombosis (VTE)/Deep Vein Thrombosis (DVT)Prevention Measures  Outcome: PROGRESSING AS EXPECTED     Problem: Knowledge Deficit  Goal: Knowledge of disease process/condition, treatment plan, diagnostic tests, and medications will improve  Outcome: PROGRESSING AS EXPECTED     Problem: Pain Management  Goal: Pain level will decrease to patient's comfort goal  Outcome: PROGRESSING AS EXPECTED  Note:   Prn pain meds and ice effective

## 2019-09-27 ENCOUNTER — APPOINTMENT (OUTPATIENT)
Dept: RADIOLOGY | Facility: MEDICAL CENTER | Age: 39
DRG: 558 | End: 2019-09-27
Attending: STUDENT IN AN ORGANIZED HEALTH CARE EDUCATION/TRAINING PROGRAM

## 2019-09-27 ENCOUNTER — APPOINTMENT (OUTPATIENT)
Dept: RADIOLOGY | Facility: MEDICAL CENTER | Age: 39
DRG: 558 | End: 2019-09-27
Attending: INTERNAL MEDICINE

## 2019-09-27 LAB
ANION GAP SERPL CALC-SCNC: 6 MMOL/L (ref 0–11.9)
BASOPHILS # BLD AUTO: 0.6 % (ref 0–1.8)
BASOPHILS # BLD: 0.05 K/UL (ref 0–0.12)
BUN SERPL-MCNC: 12 MG/DL (ref 8–22)
CALCIUM SERPL-MCNC: 9 MG/DL (ref 8.5–10.5)
CHLORIDE SERPL-SCNC: 105 MMOL/L (ref 96–112)
CO2 SERPL-SCNC: 26 MMOL/L (ref 20–33)
CREAT SERPL-MCNC: 0.56 MG/DL (ref 0.5–1.4)
EOSINOPHIL # BLD AUTO: 0.18 K/UL (ref 0–0.51)
EOSINOPHIL NFR BLD: 2.1 % (ref 0–6.9)
ERYTHROCYTE [DISTWIDTH] IN BLOOD BY AUTOMATED COUNT: 41.8 FL (ref 35.9–50)
GLUCOSE SERPL-MCNC: 94 MG/DL (ref 65–99)
HCT VFR BLD AUTO: 41.7 % (ref 37–47)
HGB BLD-MCNC: 13.9 G/DL (ref 12–16)
IMM GRANULOCYTES # BLD AUTO: 0.03 K/UL (ref 0–0.11)
IMM GRANULOCYTES NFR BLD AUTO: 0.4 % (ref 0–0.9)
LYMPHOCYTES # BLD AUTO: 1.75 K/UL (ref 1–4.8)
LYMPHOCYTES NFR BLD: 20.6 % (ref 22–41)
MCH RBC QN AUTO: 30.7 PG (ref 27–33)
MCHC RBC AUTO-ENTMCNC: 33.3 G/DL (ref 33.6–35)
MCV RBC AUTO: 92.1 FL (ref 81.4–97.8)
MONOCYTES # BLD AUTO: 0.31 K/UL (ref 0–0.85)
MONOCYTES NFR BLD AUTO: 3.6 % (ref 0–13.4)
NEUTROPHILS # BLD AUTO: 6.18 K/UL (ref 2–7.15)
NEUTROPHILS NFR BLD: 72.7 % (ref 44–72)
NRBC # BLD AUTO: 0 K/UL
NRBC BLD-RTO: 0 /100 WBC
PLATELET # BLD AUTO: 287 K/UL (ref 164–446)
PMV BLD AUTO: 9.8 FL (ref 9–12.9)
POTASSIUM SERPL-SCNC: 4.5 MMOL/L (ref 3.6–5.5)
RBC # BLD AUTO: 4.53 M/UL (ref 4.2–5.4)
SODIUM SERPL-SCNC: 137 MMOL/L (ref 135–145)
WBC # BLD AUTO: 8.5 K/UL (ref 4.8–10.8)

## 2019-09-27 PROCEDURE — A9270 NON-COVERED ITEM OR SERVICE: HCPCS | Performed by: STUDENT IN AN ORGANIZED HEALTH CARE EDUCATION/TRAINING PROGRAM

## 2019-09-27 PROCEDURE — 74018 RADEX ABDOMEN 1 VIEW: CPT

## 2019-09-27 PROCEDURE — 36415 COLL VENOUS BLD VENIPUNCTURE: CPT

## 2019-09-27 PROCEDURE — 700102 HCHG RX REV CODE 250 W/ 637 OVERRIDE(OP): Performed by: STUDENT IN AN ORGANIZED HEALTH CARE EDUCATION/TRAINING PROGRAM

## 2019-09-27 PROCEDURE — 80048 BASIC METABOLIC PNL TOTAL CA: CPT

## 2019-09-27 PROCEDURE — 700102 HCHG RX REV CODE 250 W/ 637 OVERRIDE(OP): Performed by: INTERNAL MEDICINE

## 2019-09-27 PROCEDURE — 85025 COMPLETE CBC W/AUTO DIFF WBC: CPT

## 2019-09-27 PROCEDURE — 770006 HCHG ROOM/CARE - MED/SURG/GYN SEMI*

## 2019-09-27 PROCEDURE — 700111 HCHG RX REV CODE 636 W/ 250 OVERRIDE (IP): Performed by: STUDENT IN AN ORGANIZED HEALTH CARE EDUCATION/TRAINING PROGRAM

## 2019-09-27 PROCEDURE — 700105 HCHG RX REV CODE 258: Performed by: STUDENT IN AN ORGANIZED HEALTH CARE EDUCATION/TRAINING PROGRAM

## 2019-09-27 PROCEDURE — A9270 NON-COVERED ITEM OR SERVICE: HCPCS | Performed by: INTERNAL MEDICINE

## 2019-09-27 PROCEDURE — 99233 SBSQ HOSP IP/OBS HIGH 50: CPT | Mod: GC | Performed by: INTERNAL MEDICINE

## 2019-09-27 RX ORDER — KETOROLAC TROMETHAMINE 30 MG/ML
30 INJECTION, SOLUTION INTRAMUSCULAR; INTRAVENOUS EVERY 6 HOURS PRN
Status: DISCONTINUED | OUTPATIENT
Start: 2019-09-27 | End: 2019-09-28 | Stop reason: HOSPADM

## 2019-09-27 RX ORDER — DOCUSATE SODIUM 100 MG/1
200 CAPSULE, LIQUID FILLED ORAL ONCE
Status: DISCONTINUED | OUTPATIENT
Start: 2019-09-27 | End: 2019-09-27

## 2019-09-27 RX ORDER — ONDANSETRON 2 MG/ML
4 INJECTION INTRAMUSCULAR; INTRAVENOUS EVERY 6 HOURS PRN
Status: DISCONTINUED | OUTPATIENT
Start: 2019-09-27 | End: 2019-09-28 | Stop reason: HOSPADM

## 2019-09-27 RX ORDER — ONDANSETRON 2 MG/ML
4 INJECTION INTRAMUSCULAR; INTRAVENOUS ONCE
Status: DISCONTINUED | OUTPATIENT
Start: 2019-09-27 | End: 2019-09-27

## 2019-09-27 RX ORDER — SODIUM CHLORIDE, SODIUM LACTATE, POTASSIUM CHLORIDE, CALCIUM CHLORIDE 600; 310; 30; 20 MG/100ML; MG/100ML; MG/100ML; MG/100ML
INJECTION, SOLUTION INTRAVENOUS CONTINUOUS
Status: DISCONTINUED | OUTPATIENT
Start: 2019-09-27 | End: 2019-09-28 | Stop reason: HOSPADM

## 2019-09-27 RX ADMIN — IBUPROFEN 800 MG: 800 TABLET, FILM COATED ORAL at 04:41

## 2019-09-27 RX ADMIN — ACETAMINOPHEN 1000 MG: 500 TABLET ORAL at 04:41

## 2019-09-27 RX ADMIN — ACETAMINOPHEN 1000 MG: 500 TABLET ORAL at 00:07

## 2019-09-27 RX ADMIN — KETOROLAC TROMETHAMINE 30 MG: 30 INJECTION, SOLUTION INTRAMUSCULAR at 16:38

## 2019-09-27 RX ADMIN — POLYETHYLENE GLYCOL 3350 1 PACKET: 17 POWDER, FOR SOLUTION ORAL at 04:41

## 2019-09-27 RX ADMIN — Medication 1 EACH: at 14:33

## 2019-09-27 RX ADMIN — OMEPRAZOLE 20 MG: 20 CAPSULE, DELAYED RELEASE ORAL at 04:41

## 2019-09-27 RX ADMIN — KETOROLAC TROMETHAMINE 30 MG: 30 INJECTION, SOLUTION INTRAMUSCULAR at 22:54

## 2019-09-27 RX ADMIN — METHYLNALTREXONE BROMIDE 12 MG: 12 INJECTION, SOLUTION SUBCUTANEOUS at 17:40

## 2019-09-27 RX ADMIN — SODIUM CHLORIDE, POTASSIUM CHLORIDE, SODIUM LACTATE AND CALCIUM CHLORIDE: 600; 310; 30; 20 INJECTION, SOLUTION INTRAVENOUS at 08:29

## 2019-09-27 RX ADMIN — ONDANSETRON 4 MG: 2 INJECTION INTRAMUSCULAR; INTRAVENOUS at 20:32

## 2019-09-27 RX ADMIN — SENNOSIDES, DOCUSATE SODIUM 2 TABLET: 50; 8.6 TABLET, FILM COATED ORAL at 04:41

## 2019-09-27 RX ADMIN — PREDNISONE 40 MG: 20 TABLET ORAL at 04:41

## 2019-09-27 RX ADMIN — ENOXAPARIN SODIUM 40 MG: 100 INJECTION SUBCUTANEOUS at 04:41

## 2019-09-27 NOTE — CARE PLAN
Problem: Pain Management  Goal: Pain level will decrease to patient's comfort goal  Outcome: PROGRESSING AS EXPECTED  Note:   Scheduled pain meds,ice and muscle rub effective      Problem: Bowel/Gastric:  Goal: Normal bowel function is maintained or improved  Outcome: PROGRESSING SLOWER THAN EXPECTED  Note:   bowl meds given, patient ambulating kent multiple times and drinking warm liquids, passing gas but no bowel movement during shift

## 2019-09-27 NOTE — CARE PLAN
Problem: Knowledge Deficit  Goal: Knowledge of disease process/condition, treatment plan, diagnostic tests, and medications will improve  Note:   Updated on POC for the day     Problem: Pain Management  Goal: Pain level will decrease to patient's comfort goal  Note:   Repositioned for comfort

## 2019-09-27 NOTE — PROGRESS NOTES
Assumed pt care at 0715.  Received report from moriah RN.  Assessment completed.  Pt AAOx4.  Pt has no comlaints of pain at this time.  No other s/s of discomfort or distress. Pt ambulating during night per pt.  No BM at this time.  Bed in lowest position and locked.  Pt calls appropriately.  Treaded socks in place, call light within reach and staff numbers provided.  Pt needs met at this time.

## 2019-09-28 ENCOUNTER — APPOINTMENT (OUTPATIENT)
Dept: RADIOLOGY | Facility: MEDICAL CENTER | Age: 39
DRG: 558 | End: 2019-09-28
Attending: STUDENT IN AN ORGANIZED HEALTH CARE EDUCATION/TRAINING PROGRAM

## 2019-09-28 VITALS
DIASTOLIC BLOOD PRESSURE: 80 MMHG | WEIGHT: 160.94 LBS | TEMPERATURE: 98.6 F | BODY MASS INDEX: 29.62 KG/M2 | SYSTOLIC BLOOD PRESSURE: 117 MMHG | RESPIRATION RATE: 16 BRPM | HEART RATE: 60 BPM | HEIGHT: 62 IN | OXYGEN SATURATION: 96 %

## 2019-09-28 LAB
ALBUMIN SERPL BCP-MCNC: 3.5 G/DL (ref 3.2–4.9)
ALBUMIN/GLOB SERPL: 1.3 G/DL
ALP SERPL-CCNC: 72 U/L (ref 30–99)
ALT SERPL-CCNC: 71 U/L (ref 2–50)
ANION GAP SERPL CALC-SCNC: 7 MMOL/L (ref 0–11.9)
AST SERPL-CCNC: 63 U/L (ref 12–45)
BACTERIA BLD CULT: NORMAL
BACTERIA BLD CULT: NORMAL
BILIRUB SERPL-MCNC: 0.3 MG/DL (ref 0.1–1.5)
BUN SERPL-MCNC: 16 MG/DL (ref 8–22)
CALCIUM SERPL-MCNC: 8.6 MG/DL (ref 8.5–10.5)
CHLORIDE SERPL-SCNC: 110 MMOL/L (ref 96–112)
CO2 SERPL-SCNC: 23 MMOL/L (ref 20–33)
CREAT SERPL-MCNC: 0.61 MG/DL (ref 0.5–1.4)
GLOBULIN SER CALC-MCNC: 2.7 G/DL (ref 1.9–3.5)
GLUCOSE SERPL-MCNC: 75 MG/DL (ref 65–99)
POTASSIUM SERPL-SCNC: 4.2 MMOL/L (ref 3.6–5.5)
PROT SERPL-MCNC: 6.2 G/DL (ref 6–8.2)
SIGNIFICANT IND 70042: NORMAL
SIGNIFICANT IND 70042: NORMAL
SITE SITE: NORMAL
SITE SITE: NORMAL
SODIUM SERPL-SCNC: 140 MMOL/L (ref 135–145)
SOURCE SOURCE: NORMAL
SOURCE SOURCE: NORMAL

## 2019-09-28 PROCEDURE — 700102 HCHG RX REV CODE 250 W/ 637 OVERRIDE(OP): Performed by: INTERNAL MEDICINE

## 2019-09-28 PROCEDURE — 74176 CT ABD & PELVIS W/O CONTRAST: CPT

## 2019-09-28 PROCEDURE — A9270 NON-COVERED ITEM OR SERVICE: HCPCS | Performed by: STUDENT IN AN ORGANIZED HEALTH CARE EDUCATION/TRAINING PROGRAM

## 2019-09-28 PROCEDURE — 700102 HCHG RX REV CODE 250 W/ 637 OVERRIDE(OP): Performed by: STUDENT IN AN ORGANIZED HEALTH CARE EDUCATION/TRAINING PROGRAM

## 2019-09-28 PROCEDURE — 80053 COMPREHEN METABOLIC PANEL: CPT

## 2019-09-28 PROCEDURE — 700111 HCHG RX REV CODE 636 W/ 250 OVERRIDE (IP): Performed by: STUDENT IN AN ORGANIZED HEALTH CARE EDUCATION/TRAINING PROGRAM

## 2019-09-28 PROCEDURE — A9270 NON-COVERED ITEM OR SERVICE: HCPCS | Performed by: INTERNAL MEDICINE

## 2019-09-28 PROCEDURE — 36415 COLL VENOUS BLD VENIPUNCTURE: CPT

## 2019-09-28 PROCEDURE — 99239 HOSP IP/OBS DSCHRG MGMT >30: CPT | Mod: GC | Performed by: INTERNAL MEDICINE

## 2019-09-28 PROCEDURE — 700105 HCHG RX REV CODE 258: Performed by: STUDENT IN AN ORGANIZED HEALTH CARE EDUCATION/TRAINING PROGRAM

## 2019-09-28 RX ORDER — METOCLOPRAMIDE HYDROCHLORIDE 5 MG/ML
10 INJECTION INTRAMUSCULAR; INTRAVENOUS EVERY 6 HOURS
Status: DISCONTINUED | OUTPATIENT
Start: 2019-09-28 | End: 2019-09-28 | Stop reason: HOSPADM

## 2019-09-28 RX ORDER — METOCLOPRAMIDE 5 MG/1
5 TABLET ORAL 4 TIMES DAILY
Qty: 8 TAB | Refills: 0 | Status: SHIPPED | OUTPATIENT
Start: 2019-09-28 | End: 2019-09-30

## 2019-09-28 RX ADMIN — IBUPROFEN 800 MG: 800 TABLET, FILM COATED ORAL at 17:11

## 2019-09-28 RX ADMIN — PREDNISONE 40 MG: 20 TABLET ORAL at 04:52

## 2019-09-28 RX ADMIN — KETOROLAC TROMETHAMINE 30 MG: 30 INJECTION, SOLUTION INTRAMUSCULAR at 04:51

## 2019-09-28 RX ADMIN — METOCLOPRAMIDE 10 MG: 5 INJECTION, SOLUTION INTRAMUSCULAR; INTRAVENOUS at 17:12

## 2019-09-28 RX ADMIN — OMEPRAZOLE 20 MG: 20 CAPSULE, DELAYED RELEASE ORAL at 04:52

## 2019-09-28 RX ADMIN — IBUPROFEN 800 MG: 800 TABLET, FILM COATED ORAL at 11:55

## 2019-09-28 RX ADMIN — SODIUM CHLORIDE, POTASSIUM CHLORIDE, SODIUM LACTATE AND CALCIUM CHLORIDE: 600; 310; 30; 20 INJECTION, SOLUTION INTRAVENOUS at 03:58

## 2019-09-28 RX ADMIN — SENNOSIDES, DOCUSATE SODIUM 2 TABLET: 50; 8.6 TABLET, FILM COATED ORAL at 17:12

## 2019-09-28 RX ADMIN — ENOXAPARIN SODIUM 40 MG: 100 INJECTION SUBCUTANEOUS at 04:53

## 2019-09-28 RX ADMIN — METOCLOPRAMIDE 10 MG: 5 INJECTION, SOLUTION INTRAMUSCULAR; INTRAVENOUS at 14:34

## 2019-09-28 RX ADMIN — ONDANSETRON 4 MG: 2 INJECTION INTRAMUSCULAR; INTRAVENOUS at 04:51
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
## 2019-09-28 NOTE — CARE PLAN
Problem: Bowel/Gastric:  Goal: Normal bowel function is maintained or improved  Outcome: NOT MET  Note:   Patient still has not had BM since 9/21     Problem: Discharge Barriers/Planning  Goal: Patient's continuum of care needs will be met  Outcome: PROGRESSING AS EXPECTED  Note:   Patient has not had BM since 9/21.  Waiting for patient to have BM/GI to see patient     Problem: Pain Management  Goal: Pain level will decrease to patient's comfort goal  Outcome: PROGRESSING AS EXPECTED  Note:   Prn pain medications ordered

## 2019-09-28 NOTE — CARE PLAN
Problem: Pain Management  Goal: Pain level will decrease to patient's comfort goal  Outcome: PROGRESSING AS EXPECTED     Problem: Bowel/Gastric:  Goal: Normal bowel function is maintained or improved  Outcome: PROGRESSING SLOWER THAN EXPECTED  Goal: Will not experience complications related to bowel motility  Outcome: PROGRESSING SLOWER THAN EXPECTED     Abdominal discomfort managed with PRN pain/anti-emetic medications. Hypoactive bowel sounds, no flatus or stool.

## 2019-09-29 NOTE — CONSULTS
Gastroenterology Consult Note     Date of Consult: 09/28/2019  Referring Physician: Jewels     Reason for consult: constipation        HPI: This is a 39 yo female admitted with right shoulder tendinitis who we are asked to see regarding constipation. The patient says that she has had problems with constipation for years since the birth of her youngest child. She has been seen by a GI doc in [place] and was getting Linzess to help with her BMs. She says that she recently ran out of this and did not get more because she did not have a f/u appointment with GI arranged. She says that she is passing gas. She does not have pain at this time, but notes she feels full. She has had 2 EGD/colonoscopies recently and no bowel obstruction was noted on CT. The primary team indicates that she has been tried on mag citrate, miralax, 3 different types of enemas and Relistor without a BM. She recently received a dose of Senna     PMHX:  Past Medical History:   Diagnosis Date   • Hx of ectopic pregnancy x 2 11/10/2011   • Migraine           PSurgHx:   Past Surgical History:   Procedure Laterality Date   • TONSILLECTOMY Bilateral 2019   • LAPAROSCOPIC ASSISTED SUBTOTAL HYSTERECTOMY  12/4/2018    Procedure: LAPAROSCOPIC ASSISTED TOTAL HYSTERECTOMY, SALPINGECTOMY,, VAGINAL VAULT SUSPENSION, CYSTOSCOPY;  Surgeon: Babatunde Glass M.D.;  Location: SURGERY [facility] ([place], [place]);  Service: Gynecology   • BRITTANY BY LAPAROSCOPY  4/30/2012    Performed by FREDI ROSARIO at SURGERY [facility]   • ABDOMINAL EXPLORATION     • CHOLECYSTECTOMY     • GYN SURGERY     • TUBE & ECTOPIC PREG., REMOVAL  2001 & 1/2011    ectopic pregnancy removed first in 2001, 1/2011 Right tube and ovary removed due to another ectopic pregnancy        ALLERGIES:Shellfish allergy     SocHx:   Social History     Socioeconomic History   • Marital status:      Spouse name: Not on file   • Number of children: Not on file   •  Years of education: Not on file   • Highest education level: Not on file   Occupational History   • Not on file   Social Needs   • Financial resource strain: Not on file   • Food insecurity:     Worry: Not on file     Inability: Not on file   • Transportation needs:     Medical: Not on file     Non-medical: Not on file   Tobacco Use   • Smoking status: Passive Smoke Exposure - Never Smoker   • Smokeless tobacco: Never Used   Substance and Sexual Activity   • Alcohol use: No   • Drug use: No   • Sexual activity: Yes     Partners: Male   Lifestyle   • Physical activity:     Days per week: Not on file     Minutes per session: Not on file   • Stress: Not on file   Relationships   • Social connections:     Talks on phone: Not on file     Gets together: Not on file     Attends Synagogue service: Not on file     Active member of club or organization: Not on file     Attends meetings of clubs or organizations: Not on file     Relationship status: Not on file   • Intimate partner violence:     Fear of current or ex partner: Not on file     Emotionally abused: Not on file     Physically abused: Not on file     Forced sexual activity: Not on file   Other Topics Concern   • Not on file   Social History Narrative    ** Merged History Encounter **         ** Merged History Encounter **             FAMHx:   Family History   Problem Relation Age of Onset   • Hypertension Mother    • Diabetes Maternal Grandmother         ROS:  Constitutional: No fevers, chills, no night sweats, no weight changes  HEENT: no vision or hearing changes, no dry mouth, no change in smell  CARDIO: no palpitations, no orthopnea, no chest pain  PULM: no cough, no shortness of breath  NEURO: no Seizures, no memory impairment, no change in sensation  GI: as above  : no dysuria, no hematuria  HEME: no anemia, no easy brusing  MUSCULOSKELETAL: no muscle aches, no back pain, +shoulder pain  PSYCH: no anxiety or depression  SKIN: no rashes     PE:  Vitals:     09/27/19 2100 09/28/19 0500 09/28/19 0900 09/28/19 1700   BP: 118/64 (!) 98/59 103/63 117/80   Pulse: 74 60 66 60   Resp: 17 17 16 16   Temp:  36.7 °C (98 °F) 37.1 °C (98.7 °F) 37 °C (98.6 °F)   TempSrc: Temporal Temporal Temporal Temporal   SpO2: 98% 96%     Weight:       Height:         Gen: AAOx3, NAD, lying in bed  HEENT: PERRL, EOMI, nares patent, Mucous membranes moist  Neck: supple, no cervical or supraclavicular adenopathy  CVS: regular rhythm, normal rate, no MRG  Pulm: CTAB, no crackles  Abd: soft, mild distension, NT, no guarding or rebound  Ext: no edema, normal sensation  NEURO: grossly normal, no weakness  Skin: warm, no rash  Psych: normal Affect, no anxiety     LABS:  Lab Results   Component Value Date/Time    SODIUM 140 09/28/2019 04:17 AM    POTASSIUM 4.2 09/28/2019 04:17 AM    CHLORIDE 110 09/28/2019 04:17 AM    CO2 23 09/28/2019 04:17 AM    GLUCOSE 75 09/28/2019 04:17 AM    BUN 16 09/28/2019 04:17 AM    CREATININE 0.61 09/28/2019 04:17 AM      Lab Results   Component Value Date/Time    WBC 8.5 09/27/2019 08:16 AM    RBC 4.53 09/27/2019 08:16 AM    HEMOGLOBIN 13.9 09/27/2019 08:16 AM    HEMATOCRIT 41.7 09/27/2019 08:16 AM    MCV 92.1 09/27/2019 08:16 AM    MCH 30.7 09/27/2019 08:16 AM    MCHC 33.3 (L) 09/27/2019 08:16 AM    MPV 9.8 09/27/2019 08:16 AM    NEUTSPOLYS 72.70 (H) 09/27/2019 08:16 AM    LYMPHOCYTES 20.60 (L) 09/27/2019 08:16 AM    MONOCYTES 3.60 09/27/2019 08:16 AM    EOSINOPHILS 2.10 09/27/2019 08:16 AM    BASOPHILS 0.60 09/27/2019 08:16 AM    HYPOCHROMIA 1+ 06/08/2012 12:30 PM        Lab Results   Component Value Date/Time    PROTHROMBTM 13.5 12/21/2013 01:25 AM    INR 1.01 12/21/2013 01:25 AM      Recent Labs     09/22/19  1627 09/28/19  0417   ASTSGOT 16 63*   ALTSGPT 17 71*   TBILIRUBIN 0.3 0.3   GLOBULIN 3.1 2.7          Problem List Items Addressed This Visit     * (Principal) Rotator cuff tendonitis, right     No history of trauma .  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   Xray right shoulder shows minimal acromioclavicular and glenohumeral osteoarthrosis  - Ortho consulted; Dr. Ribeiro, s/p right shoulder joint arthrocentesis - minimal fluid on tap (few WBCs; negative for crystals and negative gm stain);   - MRI rt Shoulder - . No effusion in the joint space is noticed. Inflammation and edema of the rotator cuff muscles.      Plan:   - discharge home with out patient Physical therapy  2 x week .  - Pain medications : Tylenol and Ibuprofen  -  Flexeril for muscle spasms   -Short course of  Prednisone 40 mg daily for 5 days to decrease the inflammation and edema of the muscles           Relevant Orders    REFERRAL TO PHYSICAL THERAPY Reason for Therapy: Eval/Treat/Report      Other Visit Diagnoses     Acute pain of right shoulder        Relevant Orders    REFERRAL TO HOME HEALTH    Rotator cuff tendinitis, right        Relevant Orders    REFERRAL TO HOME HEALTH           ASSESSMENT: 37 yo female with shoulder tendinitis and pain. She has been treated with pain medication and now has worsening of her chronic constipation. She uses Linzess with good effect as outpatient. She is currently out of this medication and the hospital does not carry this medication. I recommend that call her GI doctor on Monday to get Linzess samples     PLAN:   1) From GI perspective, constipation is not an indication to remain in the hospital  2) Patient has been advised to use Senna and or Dulcolax at home  3) call her GI doc on Monday and get more Linzess samples  4) return to the hospital if increased distension or lack of flatus      Thank you for this consult.     Tristian Stearns MD

## 2019-09-29 NOTE — PROGRESS NOTES
Patient left in good condition. AVS given to patient and signed copy placed in chart.  All questions were answered.  IV removed prior to discharge.  Patient left with family to home.

## 2019-09-29 NOTE — DISCHARGE INSTRUCTIONS
Discharge Instructions    Discharged to home by car with relative. Discharged via walking, hospital escort: Refused.  Special equipment needed: Not Applicable    Be sure to schedule a follow-up appointment with your primary care doctor or any specialists as instructed.     Discharge Plan:   Diet Plan: Discussed  Activity Level: Discussed  Confirmed Follow up Appointment: Patient to Call and Schedule Appointment  Confirmed Symptoms Management: Discussed  Medication Reconciliation Updated: Yes  Influenza Vaccine Indication: Patient Refuses    I understand that a diet low in cholesterol, fat, and sodium is recommended for good health. Unless I have been given specific instructions below for another diet, I accept this instruction as my diet prescription.   Other diet: none    Special Instructions: None    · Is patient discharged on Warfarin / Coumadin?   No     Depression / Suicide Risk    As you are discharged from this [facility] facility, it is important to learn how to keep safe from harming yourself.    Recognize the warning signs:  · Abrupt changes in personality, positive or negative- including increase in energy   · Giving away possessions  · Change in eating patterns- significant weight changes-  positive or negative  · Change in sleeping patterns- unable to sleep or sleeping all the time   · Unwillingness or inability to communicate  · Depression  · Unusual sadness, discouragement and loneliness  · Talk of wanting to die  · Neglect of personal appearance   · Rebelliousness- reckless behavior  · Withdrawal from people/activities they love  · Confusion- inability to concentrate     If you or a loved one observes any of these behaviors or has concerns about self-harm, here's what you can do:  · Talk about it- your feelings and reasons for harming yourself  · Remove any means that you might use to hurt yourself (examples: pills, rope, extension cords, firearm)  · Get professional help from the community  (Mental Health, Substance Abuse, psychological counseling)  · Do not be alone:Call your Safe Contact- someone whom you trust who will be there for you.  · Call your local CRISIS HOTLINE 740-7874 or 249-440-1473  · Call your local Children's Mobile Crisis Response Team [place] (615) 168-8845 or www.Graphicly  · Call the toll free National Suicide Prevention Hotlines   · National Suicide Prevention Lifeline 773-611-WQFR (2714)  · National Hope Line Network 800-SUICIDE (662-9297)

## 2019-10-04 ENCOUNTER — OFFICE VISIT (OUTPATIENT)
Dept: MEDICAL GROUP | Facility: PHYSICIAN GROUP | Age: 39
End: 2019-10-04
Payer: COMMERCIAL

## 2019-10-04 VITALS
RESPIRATION RATE: 16 BRPM | WEIGHT: 163 LBS | BODY MASS INDEX: 29.81 KG/M2 | TEMPERATURE: 98.4 F | DIASTOLIC BLOOD PRESSURE: 60 MMHG | SYSTOLIC BLOOD PRESSURE: 98 MMHG | OXYGEN SATURATION: 98 % | HEART RATE: 80 BPM

## 2019-10-04 DIAGNOSIS — Z09 HOSPITAL DISCHARGE FOLLOW-UP: ICD-10-CM

## 2019-10-04 DIAGNOSIS — R10.9 STOMACH PAIN: ICD-10-CM

## 2019-10-04 DIAGNOSIS — M75.81 ROTATOR CUFF TENDONITIS, RIGHT: ICD-10-CM

## 2019-10-04 DIAGNOSIS — E55.9 VITAMIN D DEFICIENCY: ICD-10-CM

## 2019-10-04 DIAGNOSIS — N93.9 VAGINAL BLEEDING: ICD-10-CM

## 2019-10-04 PROCEDURE — 99215 OFFICE O/P EST HI 40 MIN: CPT | Performed by: NURSE PRACTITIONER

## 2019-10-04 RX ORDER — ERGOCALCIFEROL 1.25 MG/1
50000 CAPSULE ORAL
Qty: 12 CAP | Refills: 0 | Status: SHIPPED | OUTPATIENT
Start: 2019-10-04

## 2019-10-04 NOTE — LETTER
October 4, 2019         Patient: Claire Kwong   YOB: 1980   Date of Visit: 10/4/2019           To Whom it May Concern:    Claire Kwong was seen in my clinic on 10/4/2019. She may return with mild (limited motion) right shoulder/arm until seen by Orthopaedic specialist for further evaluation and recommendations on how to proceed with patient care.    If you have any questions or concerns, please don't hesitate to call.        Sincerely,           [facility].  Electronically Signed

## 2019-10-05 NOTE — ASSESSMENT & PLAN NOTE
"Patient was admitted to [facility] ED on 9/22/2019 for multiple issues, abdominal pain and right shoulder pain.  Reviewed hospital records, CT of abdomen and pelvis without showed mild constipation and other less likely contributory findings (refer to imaging for further details), MRI of the shoulder showed: \" Dorsal greater than anterior T2 hyperintensity and enhancement crosses muscle bellies and subcutaneous fat. This could be from contusions. Recommend clinical correlation. The provided history indicates septic arthropathy is of concern so if there has been penetrating wounds in these regions as can be seen with joint injections, this could explain these findings. Phlegmonous change is in the differential but no abscess or other abnormal fluid collection to suggest septic arthropathy is seen\".  Shoulder pain improved with a course of prednisone and cyclobenzaprine.  Patient was placed on bowel protocol without significant changes with abdominal pain.  GI was consulted and recommends linaclotide and follow-up with GI.  Also noted a diagnosis of bloody vaginal discharge for one episode (status post hysterectomy without menses, patient was receiving heparin injections in the hospital for DVT prophylaxis).  Following a few day course patient was discharged in stable condition with the following recommendations.  Patient was discharged on Flexeril, prednisone for shoulder pain, and Reglan and MiraLAX to relieve constipation.  She was advised to avoid constipating agents and medications contributing to constipation.  She does have a pending referral to PT for shoulder.  Today she reports no issues with vaginal bleeding.  Her abdominal discomfort is still the same, and her shoulder is hurting the same despite recommended pain management.  "

## 2019-10-05 NOTE — PROGRESS NOTES
"Chief Complaint   Patient presents with   • Hospital Follow-up     HISTORY OF PRESENT ILLNESS: Patient is a 38 y.o. female, established patient who presents today to discuss medical problems as listed below:    Hospital discharge follow-up  Patient was admitted to [facility] ED on 9/22/2019 for multiple issues, abdominal pain and right shoulder pain.  Reviewed hospital records, CT of abdomen and pelvis without showed mild constipation and other less likely contributory findings (refer to imaging for further details), MRI of the shoulder showed: \" Dorsal greater than anterior T2 hyperintensity and enhancement crosses muscle bellies and subcutaneous fat. This could be from contusions. Recommend clinical correlation. The provided history indicates septic arthropathy is of concern so if there has been penetrating wounds in these regions as can be seen with joint injections, this could explain these findings. Phlegmonous change is in the differential but no abscess or other abnormal fluid collection to suggest septic arthropathy is seen\".  Shoulder pain improved with a course of prednisone and cyclobenzaprine.  Patient was placed on bowel protocol without significant changes with abdominal pain.  GI was consulted and recommends linaclotide and follow-up with GI.  Also noted a diagnosis of bloody vaginal discharge for one episode (status post hysterectomy without menses, patient was receiving heparin injections in the hospital for DVT prophylaxis).  Following a few day course patient was discharged in stable condition with the following recommendations.  Patient was discharged on Flexeril, prednisone for shoulder pain, and Reglan and MiraLAX to relieve constipation.  She was advised to avoid constipating agents and medications contributing to constipation.  She does have a pending referral to PT for shoulder.  Today she reports no issues with vaginal bleeding.  Her abdominal discomfort is still the same, and her shoulder is " hurting the same despite recommended pain management.    Vaginal bleeding  Single episode of vaginal spotting during hospital stay on 9/25/2019.  Status post recent hysterectomy in May 2019.  Patient is established patient with OB.      Patient Active Problem List    Diagnosis Date Noted   • Constipation 06/25/2019     Priority: High   • Post-tonsillectomy throat pain 06/09/2019     Priority: High   • Rotator cuff tendonitis, right 09/22/2019     Priority: Medium   • Hospital discharge follow-up 10/04/2019   • Vaginal bleeding 10/04/2019   • Bloody vaginal discharge 09/24/2019   • Vitamin D deficiency 06/27/2019   • Encounter to establish care 06/25/2019   • Annual physical exam 06/25/2019   • Screening for breast cancer 06/25/2019   • Right lower quadrant pain 06/25/2019   • Chills (without fever) 06/25/2019   • Oral phase dysphagia, secondary to surgical procedure 06/11/2019   • Cough 06/09/2019   • S/P latrell 12/21/2013   • Stomach pain 12/21/2013        Allergies: Shellfish allergy    Current Outpatient Medications   Medication Sig Dispense Refill   • vitamin D, Ergocalciferol, (DRISDOL) 27482 units Cap capsule Take 1 Cap by mouth every 7 days. 12 Cap 0   • linaCLOtide 290 MCG Cap Take 1 Capsule by mouth every day. 30 Cap 0   • ibuprofen (MOTRIN) 800 MG Tab Take 1 Tab by mouth 3 times a day for 7 days. 21 Tab 0   • omeprazole (PRILOSEC) 20 MG delayed-release capsule Take 1 Cap by mouth every day for 7 days. 7 Cap 0     No current facility-administered medications for this visit.        Social History     Tobacco Use   • Smoking status: Passive Smoke Exposure - Never Smoker   • Smokeless tobacco: Never Used   Substance Use Topics   • Alcohol use: No   • Drug use: No     Social History     Social History Narrative    ** Merged History Encounter **         ** Merged History Encounter **            Family History   Problem Relation Age of Onset   • Hypertension Mother    • Diabetes Maternal Grandmother         Allergies, past medical history, past surgical history, family history, social history reviewed and updated.    Review of Systems:      - Constitutional: Negative for fever, chills, unexpected weight change, and fatigue/generalized weakness.     - HEENT: Negative for headaches, vision changes, hearing changes, ear pain, ear discharge, rhinorrhea, sinus congestion, sore throat, and neck pain.      - Respiratory: Negative for cough, sputum production, chest congestion, dyspnea, wheezing, and crackles.      - Cardiovascular: Negative for chest pain, palpitations, orthopnea, and bilateral lower extremity edema.     - Gastrointestinal: Negative for heartburn, nausea, vomiting, abdominal pain, hematochezia, melena, diarrhea, constipation, and greasy/foul-smelling stools.     - Genitourinary: Negative for dysuria, polyuria, hematuria, pyuria, urinary urgency, and urinary incontinence.    - Musculoskeletal: Negative for myalgias, back pain, and joint pain.     - Skin: Negative for rash, itching, cyanotic skin color change.     - Neurological: Negative for dizziness, tingling, tremors, focal sensory deficit, focal weakness and headaches.     - Endo/Heme/Allergies: Does not bruise/bleed easily.     - Psychiatric/Behavioral: Negative for depression, suicidal/homicidal ideation and memory loss.      All other systems reviewed and are negative    Exam:    BP (!) 98/60   Pulse 80   Temp 36.9 °C (98.4 °F)   Resp 16   Wt 73.9 kg (163 lb)   LMP 11/30/2018   SpO2 98%   BMI 29.81 kg/m²  Body mass index is 29.81 kg/m².    Physical Exam:  Constitutional: Well-developed and well-nourished. Not diaphoretic. No distress.   Cardiovascular: Regular rate and rhythm, S1 and S2 without murmur, rubs, or gallops.    Chest: Effort normal. Clear to auscultation throughout. No adventitious sounds. No CVA tenderness.  Abdomen: Soft, non tender, mild distention. Active bowel sounds in all four quadrants. No rebound, guarding, masses or  HSM.  Musculoskeletal: Right shoulder pain.  Psychiatric:  Behavior, mood, and affect are appropriate.    LABS: 9/25  results reviewed and discussed with the patient, questions answered.    Patient was seen for 45 minutes face to face of which > 50% of appointment time was spent on counseling and coordination of care regarding the above.  Discussed multiple issues, including dietary intervention with constipation and restricted right shoulder motion until seen by specialty.    Assessment/Plan:  1. Vitamin D deficiency  After Rx completion, take OTC vitamin D3 5000 IUs daily.  - vitamin D, Ergocalciferol, (DRISDOL) 43973 units Cap capsule; Take 1 Cap by mouth every 7 days.  Dispense: 12 Cap; Refill: 0    2. Rotator cuff tendonitis, right  Uncontrolled, stable.  Continue hospital discharge recommendations including medications.  Follow-up with orthopedics for further evaluation and management.  Referral to PT pending.  - REFERRAL TO ORTHOPEDICS    3. Stomach pain  Uncontrolled, stable.  Per GI recommendations from the hospital stay, and appetite ordered, referral to GI placed.  Discussed avoiding gluten and dairy, avoid constipating agents such as narcotics.  Discussed increase daily fluid consumption, up to 2 L, preferably warm fluids.  Follow-up with GI for further recommendations and management.  - REFERRAL TO GASTROENTEROLOGY  - linaCLOtide 290 MCG Cap; Take 1 Capsule by mouth every day.  Dispense: 30 Cap; Refill: 0    4. Hospital discharge follow-up    5. Vaginal bleeding  Stable.  Patient to follow-up with OB.  - REFERRAL TO OB/GYN    Discussed with patient possible alternative diagnoses, pt is to take all medications as prescribed. If symptoms persist FU w/PCP, if symptoms worsen go to emergency room. If experiencing any side effects from prescribed medications reports to the office immediately or go to emergency room.  Reviewed indication, dosage, usage and potential adverse effects of prescribed medications.  Reviewed risks and benefits of treatment plan. Patient verbalizes understanding of all instruction and verbally agrees to plan.    Return if symptoms worsen or fail to improve.

## 2020-02-19 ENCOUNTER — APPOINTMENT (RX ONLY)
Dept: URBAN - METROPOLITAN AREA CLINIC 31 | Facility: CLINIC | Age: 40
Setting detail: DERMATOLOGY
End: 2020-02-19

## 2020-02-19 DIAGNOSIS — R20.2 PARESTHESIA OF SKIN: ICD-10-CM

## 2020-02-19 PROBLEM — R20.3 HYPERESTHESIA: Status: ACTIVE | Noted: 2020-02-19

## 2020-02-19 PROCEDURE — ? COUNSELING

## 2020-02-19 PROCEDURE — ? PRESCRIPTION

## 2020-02-19 PROCEDURE — 99202 OFFICE O/P NEW SF 15 MIN: CPT

## 2020-02-19 RX ORDER — LIDOCAINE 5 G/100G
OINTMENT TOPICAL
Qty: 1 | Refills: 11 | Status: ERX | COMMUNITY
Start: 2020-02-19

## 2020-02-19 RX ORDER — CAPSAICIN 0.1 %
CREAM (GRAM) TOPICAL
Qty: 1 | Refills: 0 | Status: ERX | COMMUNITY
Start: 2020-02-19

## 2020-02-19 RX ADMIN — LIDOCAINE: 5 OINTMENT TOPICAL at 00:00

## 2020-02-19 RX ADMIN — Medication: at 00:00

## 2020-02-19 ASSESSMENT — LOCATION ZONE DERM: LOCATION ZONE: ARM

## 2020-02-19 ASSESSMENT — LOCATION DETAILED DESCRIPTION DERM: LOCATION DETAILED: RIGHT PROXIMAL DORSAL FOREARM

## 2020-02-19 ASSESSMENT — LOCATION SIMPLE DESCRIPTION DERM: LOCATION SIMPLE: RIGHT FOREARM

## 2020-02-19 NOTE — PROCEDURE: COUNSELING
Detail Level: Detailed
Patient Specific Counseling (Will Not Stick From Patient To Patient): The patient's paresthesia and dysethesia/hyperesthesia stems from her work injury. \\nShe can try lidocaine ointment on the R forearm at night to help with the pain and help her sleep. She could also try Capsaicin Cream, and was warned to wash it off if it is too painful/intense. Ice packs could also be helpful. She can also try insulating the affected  area of her arm with some padding to protect the area from heat while at work.\\nI also recommend that she be evaluated by a neurologist, to see if they can be of any help to her.

## 2020-02-19 NOTE — HPI: BURN, UPPER EXTREMITY
How Severe Is It?: moderate
How Is Your Wound Healing?: healing slowly
Is This A New Presentation, Or A Follow-Up?: Upper Extremity Burn
Date Of Injury: 01/10/2019
Type Of Injury: Injury at work

## 2023-10-05 ENCOUNTER — HOSPITAL ENCOUNTER (OUTPATIENT)
Dept: RADIOLOGY | Facility: MEDICAL CENTER | Age: 43
End: 2023-10-05
Attending: PHYSICIAN ASSISTANT
Payer: COMMERCIAL

## 2023-10-05 DIAGNOSIS — R10.9 FLANK PAIN: ICD-10-CM

## 2024-02-02 NOTE — ED NOTES
Admitting provider at bedside.  
Med rec updated and complete.  Allergies reviewed. Pt denies   Oral antibiotic use in last 30 days at home.    
Pt sleeping on cart, no s/s of distress. Vitals stable, call light within reach, cart low and locked.     
Report to Tab STONER  
other relative/parents

## 2024-09-29 ENCOUNTER — OFFICE VISIT (OUTPATIENT)
Dept: URGENT CARE | Facility: CLINIC | Age: 44
End: 2024-09-29
Payer: COMMERCIAL

## 2024-09-29 VITALS
HEIGHT: 62 IN | BODY MASS INDEX: 33.68 KG/M2 | HEART RATE: 68 BPM | TEMPERATURE: 97.9 F | RESPIRATION RATE: 18 BRPM | DIASTOLIC BLOOD PRESSURE: 86 MMHG | SYSTOLIC BLOOD PRESSURE: 144 MMHG | WEIGHT: 183 LBS | OXYGEN SATURATION: 95 %

## 2024-09-29 DIAGNOSIS — R42 DIZZINESS: ICD-10-CM

## 2024-09-29 DIAGNOSIS — R11.0 NAUSEA: ICD-10-CM

## 2024-09-29 DIAGNOSIS — G44.209 TENSION HEADACHE: ICD-10-CM

## 2024-09-29 RX ORDER — NAPROXEN 500 MG/1
500 TABLET ORAL 2 TIMES DAILY WITH MEALS
Qty: 30 TABLET | Refills: 0 | Status: SHIPPED | OUTPATIENT
Start: 2024-09-29

## 2024-09-29 RX ORDER — METHYLPREDNISOLONE 4 MG
4 TABLET, DOSE PACK ORAL DAILY
Qty: 21 TABLET | Refills: 0 | Status: SHIPPED | OUTPATIENT
Start: 2024-09-29

## 2024-09-29 ASSESSMENT — ENCOUNTER SYMPTOMS
SORE THROAT: 0
SHORTNESS OF BREATH: 0
EYE DISCHARGE: 0
EYE PAIN: 0
DIAPHORESIS: 0
VOMITING: 0
SINUS PAIN: 1
CHILLS: 0
FEVER: 0
TINGLING: 0
EYE REDNESS: 0
CONSTIPATION: 0
DIARRHEA: 0
NAUSEA: 1
DIZZINESS: 1
ABDOMINAL PAIN: 0
HEADACHES: 1
COUGH: 0
WHEEZING: 0
NECK PAIN: 0
MYALGIAS: 0
WEAKNESS: 0

## 2024-09-29 ASSESSMENT — FIBROSIS 4 INDEX: FIB4 SCORE: 0.62

## 2024-09-30 NOTE — PROGRESS NOTES
Subjective:     Claire Kwong  is a 43 y.o. female who presents for Dizziness, Nausea, Headache, and Blood Pressure Problem       She presents today with headache that has been ongoing over the past week.  Has also been experiencing some episodic shortness of breath, sinus congestion/pain, intermittent nausea over the past 7 days as well.  She describes the headache as being on the occiput region and top of the head.  She notes dizziness occurring when she stands for long periods of time.  No change in vision or blurry vision.  No numbness/tingling or weakness of her face or extremities.  She notes some shortness of breath but it is not related to activities and does occur at random times.  She describes the shortness of breath as not being able to catch her breath.  Does not have any severe chest pain with the shortness of breath episodes, no worsened dizziness during the episodes.  She does note nausea but no vomiting.  No lower abdominal pain, no diarrhea.  Has not using medications for symptoms.  No fevers       Review of Systems   Constitutional:  Negative for chills, diaphoresis, fever and malaise/fatigue.   HENT:  Positive for congestion and sinus pain. Negative for ear discharge and sore throat.    Eyes:  Negative for pain, discharge and redness.   Respiratory:  Negative for cough, shortness of breath and wheezing.    Cardiovascular:  Negative for chest pain.   Gastrointestinal:  Positive for nausea. Negative for abdominal pain, constipation, diarrhea and vomiting.   Musculoskeletal:  Negative for myalgias and neck pain.   Neurological:  Positive for dizziness and headaches. Negative for tingling and weakness.      Allergies   Allergen Reactions    Shellfish Allergy Anaphylaxis, Hives and Shortness of Breath     Past Medical History:   Diagnosis Date    Hx of ectopic pregnancy x 2 11/10/2011    Migraine         Objective:   BP (!) 144/86   Pulse 68   Temp 36.6 °C (97.9 °F) (Temporal)   Resp 18   Ht 1.575  "m (5' 2\")   Wt 83 kg (183 lb)   LMP 11/30/2018   SpO2 95%   BMI 33.47 kg/m²   Physical Exam  Vitals and nursing note reviewed.   Constitutional:       General: She is not in acute distress.     Appearance: Normal appearance. She is not ill-appearing, toxic-appearing or diaphoretic.   HENT:      Head: Normocephalic.      Right Ear: Tympanic membrane, ear canal and external ear normal. There is no impacted cerumen.      Left Ear: Tympanic membrane, ear canal and external ear normal. There is no impacted cerumen.      Nose: No congestion or rhinorrhea.      Mouth/Throat:      Mouth: Mucous membranes are moist.      Pharynx: No oropharyngeal exudate or posterior oropharyngeal erythema.   Eyes:      General:         Right eye: No discharge.         Left eye: No discharge.      Conjunctiva/sclera: Conjunctivae normal.   Cardiovascular:      Rate and Rhythm: Normal rate and regular rhythm.   Pulmonary:      Effort: Pulmonary effort is normal. No respiratory distress.      Breath sounds: Normal breath sounds. No stridor. No wheezing or rhonchi.   Musculoskeletal:      Cervical back: Neck supple.      Comments: Reproducible tenderness palpation over the suboccipital musculature.  Range of motion of the neck normal   Lymphadenopathy:      Cervical: No cervical adenopathy.   Neurological:      General: No focal deficit present.      Mental Status: She is alert and oriented to person, place, and time.   Psychiatric:         Mood and Affect: Mood normal.         Behavior: Behavior normal.         Thought Content: Thought content normal.         Judgment: Judgment normal.             Diagnostic testing: None    Assessment/Plan:     Encounter Diagnoses   Name Primary?    Tension headache     Dizziness     Nausea           Plan for care for today's complaint includes starting the patient on Medrol Dosepak and naproxen for her tension headache symptoms.  Discussed with the patient that due to reproducibility of the suboccipital " tenderness as well as distribution of the headache I do have suspicion for tension headache at this time.  While she is experiencing subjective shortness of breath, lung auscultation was normal today, no rales, wheezes, rhonchi.  SpO2 was normal.  She does note dizziness with long periods of standing, discussed with the patient appropriate hydration techniques and to take seated breaks when needed.  Elevated blood pressure on today's examination, recommend patient follow-up with primary care provider for further management.  Monitor symptoms and return urgent care follow-up emergency department if they worsen become severe..  Prescription for Medrol Dosepak, naproxen provided.    See AVS Instructions below for written guidance provided to patient on after-visit management and care in addition to our verbal discussion during the visit.    Please note that this dictation was created using voice recognition software. I have attempted to correct all errors, but there may be sound-alike, spelling, grammar and possibly content errors that I did not discover before finalizing the note.    Cleveland Lopez PA-C